# Patient Record
Sex: FEMALE | Race: WHITE | NOT HISPANIC OR LATINO | Employment: OTHER | URBAN - METROPOLITAN AREA
[De-identification: names, ages, dates, MRNs, and addresses within clinical notes are randomized per-mention and may not be internally consistent; named-entity substitution may affect disease eponyms.]

---

## 2023-02-02 PROBLEM — M19.041 LOCALIZED PRIMARY PANTRAPEZIAL ARTHRITIS OF RIGHT HAND: Status: ACTIVE | Noted: 2023-02-02

## 2023-02-02 PROBLEM — H90.3 SENSORINEURAL HEARING LOSS (SNHL) OF BOTH EARS: Status: ACTIVE | Noted: 2023-02-02

## 2023-02-02 PROBLEM — R06.02 EXERTIONAL SHORTNESS OF BREATH: Status: ACTIVE | Noted: 2023-02-02

## 2023-02-02 PROBLEM — R20.2 NUMBNESS AND TINGLING OF RIGHT ARM: Status: ACTIVE | Noted: 2023-02-02

## 2023-02-02 PROBLEM — M93.1 KIENBOECK DISEASE OF ADULTS: Status: ACTIVE | Noted: 2023-02-02

## 2023-02-02 PROBLEM — C50.119 MALIGNANT NEOPLASM OF CENTRAL PART OF FEMALE BREAST (MULTI): Status: ACTIVE | Noted: 2023-02-02

## 2023-02-02 PROBLEM — R30.0 DYSURIA: Status: ACTIVE | Noted: 2023-02-02

## 2023-02-02 PROBLEM — R20.2 RIGHT HAND PARESTHESIA: Status: ACTIVE | Noted: 2023-02-02

## 2023-02-02 PROBLEM — N39.0 UTI (URINARY TRACT INFECTION): Status: ACTIVE | Noted: 2023-02-02

## 2023-02-02 PROBLEM — G56.11 RIGHT MEDIAN NERVE NEUROPATHY: Status: ACTIVE | Noted: 2023-02-02

## 2023-02-02 PROBLEM — L03.019 PARONYCHIA, ACUTE, FINGER: Status: ACTIVE | Noted: 2023-02-02

## 2023-02-02 PROBLEM — E55.9 MILD VITAMIN D DEFICIENCY: Status: ACTIVE | Noted: 2023-02-02

## 2023-02-02 PROBLEM — R20.0 NUMBNESS AND TINGLING OF RIGHT ARM: Status: ACTIVE | Noted: 2023-02-02

## 2023-02-02 PROBLEM — M54.12 CERVICAL RADICULOPATHY AT C8: Status: ACTIVE | Noted: 2023-02-02

## 2023-02-02 PROBLEM — R41.3 COMPLAINTS OF MEMORY DISTURBANCE: Status: ACTIVE | Noted: 2023-02-02

## 2023-02-02 PROBLEM — G56.01 CARPAL TUNNEL SYNDROME OF RIGHT WRIST: Status: ACTIVE | Noted: 2023-02-02

## 2023-02-02 PROBLEM — R20.2 TINGLING SENSATION: Status: ACTIVE | Noted: 2023-02-02

## 2023-02-02 PROBLEM — R73.9 BORDERLINE HYPERGLYCEMIA: Status: ACTIVE | Noted: 2023-02-02

## 2023-02-02 PROBLEM — I10 HTN (HYPERTENSION), BENIGN: Status: ACTIVE | Noted: 2023-02-02

## 2023-02-02 PROBLEM — M77.8 TENDONITIS OF ELBOW, RIGHT: Status: ACTIVE | Noted: 2023-02-02

## 2023-02-02 PROBLEM — M85.80 OSTEOPENIA: Status: ACTIVE | Noted: 2023-02-02

## 2023-02-02 PROBLEM — H61.23 BILATERAL IMPACTED CERUMEN: Status: ACTIVE | Noted: 2023-02-02

## 2023-02-02 PROBLEM — Z78.0 ASYMPTOMATIC POSTMENOPAUSAL STATE: Status: ACTIVE | Noted: 2023-02-02

## 2023-02-02 RX ORDER — ASPIRIN 81 MG/1
1 TABLET ORAL DAILY
COMMUNITY
Start: 2020-08-14 | End: 2023-03-16 | Stop reason: ALTCHOICE

## 2023-02-02 RX ORDER — LOSARTAN POTASSIUM 100 MG/1
1 TABLET ORAL DAILY
COMMUNITY
Start: 2016-06-06 | End: 2023-03-20 | Stop reason: SDUPTHER

## 2023-02-02 RX ORDER — EXEMESTANE 25 MG/1
1 TABLET ORAL DAILY
COMMUNITY
Start: 2014-05-08 | End: 2023-03-20 | Stop reason: WASHOUT

## 2023-02-02 RX ORDER — HYDROCHLOROTHIAZIDE 25 MG/1
1 TABLET ORAL DAILY
COMMUNITY
Start: 2016-05-02 | End: 2023-03-20 | Stop reason: SDUPTHER

## 2023-02-02 RX ORDER — ATORVASTATIN CALCIUM 20 MG/1
1 TABLET, FILM COATED ORAL DAILY
COMMUNITY
Start: 2022-04-01 | End: 2023-03-20 | Stop reason: SDUPTHER

## 2023-02-02 RX ORDER — METOPROLOL SUCCINATE 25 MG/1
1 TABLET, EXTENDED RELEASE ORAL DAILY
COMMUNITY
Start: 2014-05-08 | End: 2023-03-20 | Stop reason: SDUPTHER

## 2023-03-05 NOTE — PROGRESS NOTES
Subjective   Reason for Visit: Alicia Burris is an 68 y.o. female here for a Medicare Wellness visit.     Past Medical, Surgical, and Family History reviewed and updated in chart.         HPI      Patient is here for annual wellness visit and follow-up of chronic conditions, last seen in November.     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   - HTN on HCTZ 25mg and losartan 100mg and metoprolol 25mg, also on baby aspirin for primary prevention blood pressures were elevated at last visit but reported in the setting of her  having a TAVR, blood pressures were good at home 116/74, today slighter higher   - HLD - on atorvastatin 20mg with moderate to severe calcifications on CT chest  - Tobacco use - under 1 PPD x many years, not interested in quitting at present but may be amenable in the future. Has had her first lung cancer screening in march unremarkable.  Failed trials of NRT and Chantix in the past, previously 1 PPD, now at 1.5 packs per week! Not interested in further intervention with quitting, just reducing to 5-6 per day  has not coughed   - Osteopenia last DEXA 2017, concerned about increased risk for bone fracture given elevated FRAX in the past but not currently interested in repeating.  - Breast cancer - stage II right invasive ductal carcinoma s/p partial mastectomy and RT 2011, s/p letrozole stopped in 2020 and exemstane. Gets regular mammograms now which are stable seen by oncology as well stable.   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     Patient Care Team:  Harriet Billingsley DO as PCP - General  Dominique Tang MD as PCP - [employer] PCP     Review of Systems    Objective   Vitals:  /85 (BP Location: Left arm, Patient Position: Sitting, BP Cuff Size: Adult)   Pulse 83   Temp 36.7 °C (98.1 °F)   Ht 1.651 m (5' 5\")   Wt 68 kg (150 lb)   BMI 24.96 kg/m²       Physical Exam  Constitutional:       General: She is not in acute distress.  HENT:      Head: Normocephalic and atraumatic.   Eyes:      Conjunctiva/sclera: Conjunctivae normal.   Cardiovascular:      Rate and Rhythm: Normal rate and regular rhythm.      Heart sounds: No murmur heard.  Pulmonary:      Effort: Pulmonary effort is normal.      Breath sounds: Normal breath sounds. No wheezing or rhonchi.   Abdominal:      General: There is no distension.      Palpations: Abdomen is soft.      Tenderness: There is no abdominal tenderness. There is no guarding or rebound.   Musculoskeletal:         General: No tenderness.      Right lower leg: No edema.      Left lower leg: No edema.   Skin:     General: Skin is warm and dry.   Neurological:      General: No focal deficit present.      Mental Status: She is alert and oriented to person, place, and time.   Psychiatric:         Mood and Affect: Mood normal.         Assessment/Plan   Problem List Items Addressed This Visit          Circulatory    HTN (hypertension), benign    Current Assessment & Plan     Elevated today though home readings wtihin normal limits will continue to monitor at home.          Relevant Orders    CBC and Auto Differential    Comprehensive Metabolic Panel    Lipid Panel    TSH with reflex to Free T4 if abnormal    Hemoglobin A1C       Musculoskeletal    Osteopenia    Current Assessment & " Plan     Due for repeat bone density test          Relevant Orders    Vitamin D 25-Hydroxy,Total       Other    Kienboeck disease of adults    Current Assessment & Plan     S/p surgical intervention now with OT, continue pain control and followup          Malignant neoplasm of central part of female breast (CMS/HCC)    Current Assessment & Plan     S/p partial mastectomy and RT 2011 s/p letrozole stopped in 2020. Most recent mammogram stable.           Other Visit Diagnoses       Personal history of tobacco use, presenting hazards to health    -  Primary    Relevant Orders    CT lung screening low dose    Asymptomatic menopausal state        Relevant Orders    XR DEXA bone density    Need for vaccination        Relevant Orders    Pneumococcal conjugate vaccine 20-valent IM (Completed)    Screening for osteoporosis        Relevant Orders    XR DEXA bone density    Encounter for screening mammogram for breast cancer        Relevant Orders    BI mammo bilateral screening tomosynthesis    Screening for colorectal cancer        Relevant Orders    Cologuard® colon cancer screening    Routine general medical examination at health care facility        Relevant Orders    1 Year Follow Up In [facility] - PCP - Wellness Exam    Macrocytosis        Relevant Orders    Vitamin B12    Folate    Hyperglycemia        Relevant Orders    Hemoglobin A1C    Skin cancer screening        Relevant Orders    Referral to Dermatology

## 2023-03-05 NOTE — PROGRESS NOTES
Subjective   Reason for Visit: Alicia Burris is an 68 y.o. female here for a Medicare Wellness visit.               HPI                        Patient Care Team:  Harriet Billingsley DO as PCP - General     Review of Systems    Objective   Vitals:  There were no vitals taken for this visit.      Physical Exam    Assessment/Plan   Problem List Items Addressed This Visit    None

## 2023-03-16 ENCOUNTER — OFFICE VISIT (OUTPATIENT)
Dept: PRIMARY CARE | Facility: CLINIC | Age: 68
End: 2023-03-16
Payer: MEDICARE

## 2023-03-16 VITALS
HEIGHT: 65 IN | SYSTOLIC BLOOD PRESSURE: 161 MMHG | HEART RATE: 83 BPM | BODY MASS INDEX: 24.99 KG/M2 | WEIGHT: 150 LBS | TEMPERATURE: 98.1 F | DIASTOLIC BLOOD PRESSURE: 85 MMHG

## 2023-03-16 DIAGNOSIS — Z00.00 ROUTINE GENERAL MEDICAL EXAMINATION AT HEALTH CARE FACILITY: ICD-10-CM

## 2023-03-16 DIAGNOSIS — Z13.820 SCREENING FOR OSTEOPOROSIS: ICD-10-CM

## 2023-03-16 DIAGNOSIS — Z87.891 PERSONAL HISTORY OF TOBACCO USE, PRESENTING HAZARDS TO HEALTH: Primary | ICD-10-CM

## 2023-03-16 DIAGNOSIS — Z12.83 SKIN CANCER SCREENING: ICD-10-CM

## 2023-03-16 DIAGNOSIS — R73.9 HYPERGLYCEMIA: ICD-10-CM

## 2023-03-16 DIAGNOSIS — Z12.11 SCREENING FOR COLORECTAL CANCER: ICD-10-CM

## 2023-03-16 DIAGNOSIS — Z23 NEED FOR VACCINATION: ICD-10-CM

## 2023-03-16 DIAGNOSIS — Z12.31 ENCOUNTER FOR SCREENING MAMMOGRAM FOR BREAST CANCER: ICD-10-CM

## 2023-03-16 DIAGNOSIS — D75.89 MACROCYTOSIS: ICD-10-CM

## 2023-03-16 DIAGNOSIS — M93.1 KIENBOECK DISEASE OF ADULTS: ICD-10-CM

## 2023-03-16 DIAGNOSIS — Z78.0 ASYMPTOMATIC MENOPAUSAL STATE: ICD-10-CM

## 2023-03-16 DIAGNOSIS — C50.119 MALIGNANT NEOPLASM OF CENTRAL PORTION OF FEMALE BREAST, UNSPECIFIED ESTROGEN RECEPTOR STATUS, UNSPECIFIED LATERALITY (MULTI): ICD-10-CM

## 2023-03-16 DIAGNOSIS — Z12.12 SCREENING FOR COLORECTAL CANCER: ICD-10-CM

## 2023-03-16 DIAGNOSIS — M85.80 OSTEOPENIA, UNSPECIFIED LOCATION: ICD-10-CM

## 2023-03-16 DIAGNOSIS — I10 HTN (HYPERTENSION), BENIGN: ICD-10-CM

## 2023-03-16 PROBLEM — L03.019 PARONYCHIA, ACUTE, FINGER: Status: RESOLVED | Noted: 2023-02-02 | Resolved: 2023-03-16

## 2023-03-16 PROBLEM — G56.01 CARPAL TUNNEL SYNDROME OF RIGHT WRIST: Status: RESOLVED | Noted: 2023-02-02 | Resolved: 2023-03-16

## 2023-03-16 PROBLEM — M77.8 TENDONITIS OF ELBOW, RIGHT: Status: RESOLVED | Noted: 2023-02-02 | Resolved: 2023-03-16

## 2023-03-16 PROCEDURE — 3077F SYST BP >= 140 MM HG: CPT | Performed by: INTERNAL MEDICINE

## 2023-03-16 PROCEDURE — 3079F DIAST BP 80-89 MM HG: CPT | Performed by: INTERNAL MEDICINE

## 2023-03-16 PROCEDURE — 1159F MED LIST DOCD IN RCRD: CPT | Performed by: INTERNAL MEDICINE

## 2023-03-16 PROCEDURE — 1170F FXNL STATUS ASSESSED: CPT | Performed by: INTERNAL MEDICINE

## 2023-03-16 PROCEDURE — 4004F PT TOBACCO SCREEN RCVD TLK: CPT | Performed by: INTERNAL MEDICINE

## 2023-03-16 PROCEDURE — 90677 PCV20 VACCINE IM: CPT | Performed by: INTERNAL MEDICINE

## 2023-03-16 PROCEDURE — G0009 ADMIN PNEUMOCOCCAL VACCINE: HCPCS | Performed by: INTERNAL MEDICINE

## 2023-03-16 PROCEDURE — 99213 OFFICE O/P EST LOW 20 MIN: CPT | Performed by: INTERNAL MEDICINE

## 2023-03-16 PROCEDURE — 1160F RVW MEDS BY RX/DR IN RCRD: CPT | Performed by: INTERNAL MEDICINE

## 2023-03-16 RX ORDER — MELOXICAM 15 MG/1
15 TABLET ORAL DAILY
COMMUNITY
End: 2023-03-20 | Stop reason: SDUPTHER

## 2023-03-16 ASSESSMENT — ENCOUNTER SYMPTOMS
LOSS OF SENSATION IN FEET: 1
DEPRESSION: 0
OCCASIONAL FEELINGS OF UNSTEADINESS: 0

## 2023-03-16 ASSESSMENT — ACTIVITIES OF DAILY LIVING (ADL)
GROCERY_SHOPPING: INDEPENDENT
BATHING: INDEPENDENT
DRESSING: INDEPENDENT
MANAGING_FINANCES: NEEDS ASSISTANCE
DOING_HOUSEWORK: INDEPENDENT
TAKING_MEDICATION: INDEPENDENT

## 2023-03-16 ASSESSMENT — PATIENT HEALTH QUESTIONNAIRE - PHQ9
2. FEELING DOWN, DEPRESSED OR HOPELESS: NOT AT ALL
1. LITTLE INTEREST OR PLEASURE IN DOING THINGS: NOT AT ALL
SUM OF ALL RESPONSES TO PHQ9 QUESTIONS 1 AND 2: 0

## 2023-03-16 NOTE — PATIENT INSTRUCTIONS
Bring in your power of  documentation or living will at your next visit for us to scan   Recommend bivalent COVID booster

## 2023-03-20 DIAGNOSIS — E78.5 HYPERLIPIDEMIA, UNSPECIFIED HYPERLIPIDEMIA TYPE: ICD-10-CM

## 2023-03-20 DIAGNOSIS — I10 ESSENTIAL HYPERTENSION: Primary | ICD-10-CM

## 2023-03-20 DIAGNOSIS — R52 PAIN: Primary | ICD-10-CM

## 2023-03-20 DIAGNOSIS — I10 ESSENTIAL HYPERTENSION: ICD-10-CM

## 2023-03-20 RX ORDER — HYDROCHLOROTHIAZIDE 25 MG/1
25 TABLET ORAL DAILY
Qty: 90 TABLET | Refills: 1 | Status: SHIPPED | OUTPATIENT
Start: 2023-03-20 | End: 2023-06-20

## 2023-03-20 RX ORDER — METOPROLOL SUCCINATE 25 MG/1
25 TABLET, EXTENDED RELEASE ORAL DAILY
Qty: 90 TABLET | Refills: 1 | Status: SHIPPED | OUTPATIENT
Start: 2023-03-20 | End: 2023-06-20

## 2023-03-20 RX ORDER — LOSARTAN POTASSIUM 100 MG/1
100 TABLET ORAL DAILY
Qty: 90 TABLET | Refills: 1 | Status: SHIPPED | OUTPATIENT
Start: 2023-03-20 | End: 2023-06-20

## 2023-03-20 RX ORDER — METOPROLOL SUCCINATE 25 MG/1
25 TABLET, EXTENDED RELEASE ORAL DAILY
Qty: 90 TABLET | Refills: 1 | Status: SHIPPED | OUTPATIENT
Start: 2023-03-20 | End: 2023-03-20 | Stop reason: SDUPTHER

## 2023-03-20 RX ORDER — HYDROCHLOROTHIAZIDE 25 MG/1
25 TABLET ORAL DAILY
Qty: 90 TABLET | Refills: 1 | Status: SHIPPED | OUTPATIENT
Start: 2023-03-20 | End: 2023-03-20 | Stop reason: SDUPTHER

## 2023-03-20 RX ORDER — LOSARTAN POTASSIUM 100 MG/1
100 TABLET ORAL DAILY
Qty: 90 TABLET | Refills: 1 | Status: SHIPPED | OUTPATIENT
Start: 2023-03-20 | End: 2023-03-20 | Stop reason: SDUPTHER

## 2023-03-20 RX ORDER — ATORVASTATIN CALCIUM 20 MG/1
20 TABLET, FILM COATED ORAL DAILY
Qty: 90 TABLET | Refills: 1 | Status: SHIPPED | OUTPATIENT
Start: 2023-03-20 | End: 2023-08-07

## 2023-03-20 RX ORDER — MELOXICAM 15 MG/1
15 TABLET ORAL DAILY
Qty: 90 TABLET | Refills: 1 | Status: SHIPPED | OUTPATIENT
Start: 2023-03-20 | End: 2023-06-29

## 2023-03-20 RX ORDER — ATORVASTATIN CALCIUM 20 MG/1
20 TABLET, FILM COATED ORAL DAILY
Qty: 90 TABLET | Refills: 1 | Status: SHIPPED | OUTPATIENT
Start: 2023-03-20 | End: 2023-03-20 | Stop reason: SDUPTHER

## 2023-05-18 DIAGNOSIS — R52 PAIN: ICD-10-CM

## 2023-05-18 RX ORDER — MELOXICAM 15 MG/1
15 TABLET ORAL DAILY
Qty: 90 TABLET | Refills: 1 | Status: CANCELLED | OUTPATIENT
Start: 2023-05-18

## 2023-06-19 DIAGNOSIS — I10 ESSENTIAL HYPERTENSION: ICD-10-CM

## 2023-06-20 RX ORDER — LOSARTAN POTASSIUM 100 MG/1
TABLET ORAL
Qty: 90 TABLET | Refills: 1 | Status: SHIPPED | OUTPATIENT
Start: 2023-06-20 | End: 2023-12-06

## 2023-06-20 RX ORDER — HYDROCHLOROTHIAZIDE 25 MG/1
TABLET ORAL
Qty: 90 TABLET | Refills: 1 | Status: SHIPPED | OUTPATIENT
Start: 2023-06-20 | End: 2023-12-06

## 2023-06-20 RX ORDER — METOPROLOL SUCCINATE 25 MG/1
TABLET, EXTENDED RELEASE ORAL
Qty: 90 TABLET | Refills: 1 | Status: SHIPPED | OUTPATIENT
Start: 2023-06-20 | End: 2023-12-06

## 2023-06-28 DIAGNOSIS — R52 PAIN: ICD-10-CM

## 2023-06-29 RX ORDER — MELOXICAM 15 MG/1
TABLET ORAL
Qty: 100 TABLET | Refills: 2 | Status: SHIPPED | OUTPATIENT
Start: 2023-06-29

## 2023-08-04 DIAGNOSIS — E78.5 HYPERLIPIDEMIA, UNSPECIFIED HYPERLIPIDEMIA TYPE: ICD-10-CM

## 2023-08-07 RX ORDER — ATORVASTATIN CALCIUM 20 MG/1
20 TABLET, FILM COATED ORAL DAILY
Qty: 90 TABLET | Refills: 3 | Status: SHIPPED | OUTPATIENT
Start: 2023-08-07

## 2023-12-01 DIAGNOSIS — I10 ESSENTIAL HYPERTENSION: ICD-10-CM

## 2023-12-06 RX ORDER — HYDROCHLOROTHIAZIDE 25 MG/1
TABLET ORAL
Qty: 90 TABLET | Refills: 0 | Status: SHIPPED | OUTPATIENT
Start: 2023-12-06 | End: 2024-02-05

## 2023-12-06 RX ORDER — METOPROLOL SUCCINATE 25 MG/1
TABLET, EXTENDED RELEASE ORAL
Qty: 90 TABLET | Refills: 0 | Status: SHIPPED | OUTPATIENT
Start: 2023-12-06 | End: 2024-02-05

## 2023-12-06 RX ORDER — LOSARTAN POTASSIUM 100 MG/1
TABLET ORAL
Qty: 90 TABLET | Refills: 0 | Status: SHIPPED | OUTPATIENT
Start: 2023-12-06 | End: 2024-02-05

## 2024-01-11 ENCOUNTER — APPOINTMENT (OUTPATIENT)
Dept: RADIOLOGY | Facility: CLINIC | Age: 69
End: 2024-01-11
Payer: MEDICARE

## 2024-02-04 DIAGNOSIS — I10 ESSENTIAL HYPERTENSION: ICD-10-CM

## 2024-02-05 RX ORDER — HYDROCHLOROTHIAZIDE 25 MG/1
TABLET ORAL
Qty: 90 TABLET | Refills: 0 | Status: SHIPPED | OUTPATIENT
Start: 2024-02-05 | End: 2024-04-30

## 2024-02-05 RX ORDER — LOSARTAN POTASSIUM 100 MG/1
TABLET ORAL
Qty: 90 TABLET | Refills: 0 | Status: SHIPPED | OUTPATIENT
Start: 2024-02-05 | End: 2024-04-30

## 2024-02-05 RX ORDER — METOPROLOL SUCCINATE 25 MG/1
TABLET, EXTENDED RELEASE ORAL
Qty: 90 TABLET | Refills: 0 | Status: SHIPPED | OUTPATIENT
Start: 2024-02-05 | End: 2024-04-30

## 2024-03-28 DIAGNOSIS — R52 PAIN: ICD-10-CM

## 2024-04-19 RX ORDER — MELOXICAM 15 MG/1
15 TABLET ORAL DAILY
Qty: 100 TABLET | Refills: 2 | OUTPATIENT
Start: 2024-04-19

## 2024-04-29 DIAGNOSIS — I10 ESSENTIAL HYPERTENSION: ICD-10-CM

## 2024-04-30 RX ORDER — METOPROLOL SUCCINATE 25 MG/1
TABLET, EXTENDED RELEASE ORAL
Qty: 90 TABLET | Refills: 3 | Status: SHIPPED | OUTPATIENT
Start: 2024-04-30

## 2024-04-30 RX ORDER — LOSARTAN POTASSIUM 100 MG/1
TABLET ORAL
Qty: 90 TABLET | Refills: 3 | Status: SHIPPED | OUTPATIENT
Start: 2024-04-30

## 2024-04-30 RX ORDER — HYDROCHLOROTHIAZIDE 25 MG/1
TABLET ORAL
Qty: 90 TABLET | Refills: 3 | Status: SHIPPED | OUTPATIENT
Start: 2024-04-30

## 2024-07-05 DIAGNOSIS — E78.5 HYPERLIPIDEMIA, UNSPECIFIED HYPERLIPIDEMIA TYPE: ICD-10-CM

## 2024-07-08 RX ORDER — ATORVASTATIN CALCIUM 20 MG/1
20 TABLET, FILM COATED ORAL DAILY
Qty: 90 TABLET | Refills: 0 | Status: SHIPPED | OUTPATIENT
Start: 2024-07-08

## 2024-07-09 ENCOUNTER — OFFICE VISIT (OUTPATIENT)
Dept: PRIMARY CARE | Facility: CLINIC | Age: 69
End: 2024-07-09
Payer: MEDICARE

## 2024-07-09 VITALS
BODY MASS INDEX: 24.59 KG/M2 | WEIGHT: 144 LBS | OXYGEN SATURATION: 96 % | HEART RATE: 83 BPM | SYSTOLIC BLOOD PRESSURE: 169 MMHG | TEMPERATURE: 98 F | DIASTOLIC BLOOD PRESSURE: 87 MMHG | HEIGHT: 64 IN

## 2024-07-09 DIAGNOSIS — I10 HTN (HYPERTENSION), BENIGN: ICD-10-CM

## 2024-07-09 DIAGNOSIS — F17.200 TOBACCO USE DISORDER: ICD-10-CM

## 2024-07-09 DIAGNOSIS — D69.2 SENILE PURPURA (CMS-HCC): ICD-10-CM

## 2024-07-09 DIAGNOSIS — C50.119 MALIGNANT NEOPLASM OF CENTRAL PORTION OF FEMALE BREAST, UNSPECIFIED ESTROGEN RECEPTOR STATUS, UNSPECIFIED LATERALITY (MULTI): ICD-10-CM

## 2024-07-09 DIAGNOSIS — Z87.891 PERSONAL HISTORY OF NICOTINE DEPENDENCE: ICD-10-CM

## 2024-07-09 DIAGNOSIS — M93.1 KIENBOECK DISEASE OF ADULTS: Primary | ICD-10-CM

## 2024-07-09 DIAGNOSIS — Z12.11 COLON CANCER SCREENING: ICD-10-CM

## 2024-07-09 DIAGNOSIS — Z00.00 ROUTINE GENERAL MEDICAL EXAMINATION AT HEALTH CARE FACILITY: ICD-10-CM

## 2024-07-09 DIAGNOSIS — Z00.00 ENCOUNTER FOR PREVENTATIVE ADULT HEALTH CARE EXAMINATION: ICD-10-CM

## 2024-07-09 DIAGNOSIS — R06.09 DYSPNEA ON EXERTION: ICD-10-CM

## 2024-07-09 DIAGNOSIS — Z78.0 ASYMPTOMATIC MENOPAUSAL STATE: ICD-10-CM

## 2024-07-09 DIAGNOSIS — M85.80 OSTEOPENIA, UNSPECIFIED LOCATION: ICD-10-CM

## 2024-07-09 DIAGNOSIS — E78.5 DYSLIPIDEMIA: ICD-10-CM

## 2024-07-09 PROBLEM — R20.2 TINGLING SENSATION: Status: RESOLVED | Noted: 2023-02-02 | Resolved: 2024-07-09

## 2024-07-09 PROBLEM — N39.0 UTI (URINARY TRACT INFECTION): Status: RESOLVED | Noted: 2023-02-02 | Resolved: 2024-07-09

## 2024-07-09 PROBLEM — R30.0 DYSURIA: Status: RESOLVED | Noted: 2023-02-02 | Resolved: 2024-07-09

## 2024-07-09 PROBLEM — M19.041 LOCALIZED PRIMARY PANTRAPEZIAL ARTHRITIS OF RIGHT HAND: Status: RESOLVED | Noted: 2023-02-02 | Resolved: 2024-07-09

## 2024-07-09 PROBLEM — H61.23 BILATERAL IMPACTED CERUMEN: Status: RESOLVED | Noted: 2023-02-02 | Resolved: 2024-07-09

## 2024-07-09 PROBLEM — M54.12 CERVICAL RADICULOPATHY AT C8: Status: RESOLVED | Noted: 2023-02-02 | Resolved: 2024-07-09

## 2024-07-09 PROCEDURE — 1125F AMNT PAIN NOTED PAIN PRSNT: CPT | Performed by: INTERNAL MEDICINE

## 2024-07-09 PROCEDURE — 1159F MED LIST DOCD IN RCRD: CPT | Performed by: INTERNAL MEDICINE

## 2024-07-09 PROCEDURE — 3079F DIAST BP 80-89 MM HG: CPT | Performed by: INTERNAL MEDICINE

## 2024-07-09 PROCEDURE — 4004F PT TOBACCO SCREEN RCVD TLK: CPT | Performed by: INTERNAL MEDICINE

## 2024-07-09 PROCEDURE — 1158F ADVNC CARE PLAN TLK DOCD: CPT | Performed by: INTERNAL MEDICINE

## 2024-07-09 PROCEDURE — 1123F ACP DISCUSS/DSCN MKR DOCD: CPT | Performed by: INTERNAL MEDICINE

## 2024-07-09 PROCEDURE — G0439 PPPS, SUBSEQ VISIT: HCPCS | Performed by: INTERNAL MEDICINE

## 2024-07-09 PROCEDURE — 99214 OFFICE O/P EST MOD 30 MIN: CPT | Performed by: INTERNAL MEDICINE

## 2024-07-09 PROCEDURE — 3077F SYST BP >= 140 MM HG: CPT | Performed by: INTERNAL MEDICINE

## 2024-07-09 PROCEDURE — 1160F RVW MEDS BY RX/DR IN RCRD: CPT | Performed by: INTERNAL MEDICINE

## 2024-07-09 PROCEDURE — 1170F FXNL STATUS ASSESSED: CPT | Performed by: INTERNAL MEDICINE

## 2024-07-09 RX ORDER — AMLODIPINE AND OLMESARTAN MEDOXOMIL 5; 40 MG/1; MG/1
1 TABLET ORAL DAILY
Qty: 90 TABLET | Refills: 0 | Status: SHIPPED | OUTPATIENT
Start: 2024-07-09

## 2024-07-09 RX ORDER — NAPROXEN SODIUM 220 MG/1
81 TABLET, FILM COATED ORAL
COMMUNITY
End: 2024-07-09 | Stop reason: WASHOUT

## 2024-07-09 RX ORDER — EXEMESTANE 25 MG/1
TABLET ORAL
COMMUNITY

## 2024-07-09 RX ORDER — ALBUTEROL SULFATE 90 UG/1
2 AEROSOL, METERED RESPIRATORY (INHALATION) EVERY 4 HOURS PRN
Qty: 8 G | Refills: 11 | Status: SHIPPED | OUTPATIENT
Start: 2024-07-09 | End: 2025-07-09

## 2024-07-09 RX ORDER — KETOCONAZOLE 20 MG/ML
SHAMPOO, SUSPENSION TOPICAL
COMMUNITY
Start: 2023-12-11

## 2024-07-09 RX ORDER — AMOXICILLIN 500 MG/1
500 CAPSULE ORAL
COMMUNITY
Start: 2023-05-25 | End: 2024-07-09 | Stop reason: WASHOUT

## 2024-07-09 RX ORDER — METHYLPREDNISOLONE 4 MG/1
TABLET ORAL
COMMUNITY
Start: 2024-02-29 | End: 2024-07-09 | Stop reason: WASHOUT

## 2024-07-09 RX ORDER — DOXEPIN HYDROCHLORIDE 10 MG/1
CAPSULE ORAL
COMMUNITY
Start: 2024-04-01 | End: 2024-07-09 | Stop reason: WASHOUT

## 2024-07-09 RX ORDER — CEPHALEXIN 500 MG/1
CAPSULE ORAL
COMMUNITY
Start: 2022-08-14 | End: 2024-07-09 | Stop reason: WASHOUT

## 2024-07-09 RX ORDER — PHENAZOPYRIDINE HYDROCHLORIDE 95 MG/1
TABLET ORAL
COMMUNITY
Start: 2022-08-14 | End: 2024-07-09 | Stop reason: WASHOUT

## 2024-07-09 RX ORDER — GLUCOSAMINE HCL 500 MG
TABLET ORAL
COMMUNITY

## 2024-07-09 RX ORDER — TRIAMCINOLONE ACETONIDE 1 MG/G
CREAM TOPICAL
COMMUNITY
Start: 2023-12-12

## 2024-07-09 RX ORDER — NITROFURANTOIN 25; 75 MG/1; MG/1
CAPSULE ORAL 2 TIMES DAILY
COMMUNITY
Start: 2017-01-12 | End: 2024-07-09 | Stop reason: WASHOUT

## 2024-07-09 RX ORDER — ASPIRIN 325 MG
TABLET ORAL
COMMUNITY
End: 2024-07-09 | Stop reason: WASHOUT

## 2024-07-09 ASSESSMENT — ENCOUNTER SYMPTOMS
DEPRESSION: 0
LOSS OF SENSATION IN FEET: 1
OCCASIONAL FEELINGS OF UNSTEADINESS: 0

## 2024-07-09 ASSESSMENT — PAIN SCALES - GENERAL: PAINLEVEL: 5

## 2024-07-09 ASSESSMENT — PATIENT HEALTH QUESTIONNAIRE - PHQ9
SUM OF ALL RESPONSES TO PHQ9 QUESTIONS 1 AND 2: 0
2. FEELING DOWN, DEPRESSED OR HOPELESS: NOT AT ALL
1. LITTLE INTEREST OR PLEASURE IN DOING THINGS: NOT AT ALL

## 2024-07-09 ASSESSMENT — ACTIVITIES OF DAILY LIVING (ADL)
DRESSING: INDEPENDENT
GROCERY_SHOPPING: INDEPENDENT
BATHING: INDEPENDENT
DOING_HOUSEWORK: INDEPENDENT
MANAGING_FINANCES: INDEPENDENT
TAKING_MEDICATION: INDEPENDENT

## 2024-07-09 NOTE — PROGRESS NOTES
Subjective     Patient ID: Alicia Burris is a 69 y.o. female who presents for Annual Exam and Medicare Annual Wellness Visit Subsequent, annual wellness visit and followup of chronic conditions.   HPI  69-year-old female here for annual wellness visit and follow-up of chronic conditions, last seen in March of last year.    - Has found that she does not like to leave the house as much as she used to, believes to be since COVID.   - Fell when coming in from the back door hitting the step and fell on her knees.     PMHx:  - Kienbox disease - (osteonecrosis of the bone), has pain all the time, takes aleve for the pain, s/p partial wrist replacement still with residual discomfort of the right wrist, she still requires bracing with heating or icing and was recommended no further intervention.   - Bilatral SNHL - seen by ENT not a candidate for hearing aids.   - HTN - on HCTZ 25mg, losartan 100mg and metoprolol 25mg, has not been measuring home blood pressure readings.   - HLD - on atorvastatin 20mg - with moderate to severe calcifications on CT chest  - Tobacco use - under 1 PPD x many years, not interested in quitting -  but may be amenable in the future.  Failed trials of NRT and Chantix in the past. Has cut back to <1/2 PPD.  Not interested in smoking cessation clinic. States she  enjoys the tobacco.   - LDCT yearly the last performed in 2022  - Osteopenia -  last DEXA 2017 with elevated FRAX  - Breast cancer - stage II right invasive ductal carcinoma - s/p partial mastectomy and RT 2011, s/p letrozole and exemstane.  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   - 3 children - one son in [place] with his wife, son in [place] with wife and 3 kids, daughter in [place] recently got .     Lifestyle - lost 6 pounds since last being seen.   - Diet - does not eat much, eats later in the day, snacks on popcorn.breakfast scoops of yogurt and kashi cereal, never does lunch, varied dinner.   - Exercise - gardens every day, less in the winter, tries to get on the treadmill.     Patient Care Team:  Harriet Billingsley DO as PCP - General (Internal Medicine)  Harriet Billingsley DO as PCP - [employer] PCP      Objective       Current Outpatient Medications:     atorvastatin (Lipitor) 20 mg tablet, TAKE 1 TABLET BY MOUTH ONCE  DAILY, Disp: 90 tablet, Rfl: 0    cholecalciferol, vitamin D3, 75 mcg (3,000 unit) tablet, Take by mouth once daily., Disp: , Rfl:     hydroCHLOROthiazide (HYDRODiuril) 25 mg tablet, TAKE 1 TABLET BY MOUTH ONCE  DAILY, Disp: 90 tablet, Rfl: 3    ketoconazole (NIZOral) 2 % shampoo, , Disp: , Rfl:     losartan (Cozaar) 100 mg tablet, TAKE 1 TABLET BY MOUTH ONCE  DAILY, Disp: 90 tablet, Rfl: 3    meloxicam (Mobic) 15 mg tablet, TAKE 1 TABLET BY MOUTH ONCE  DAILY DAILY, Disp: 100 tablet, Rfl: 2    metoprolol succinate XL (Toprol-XL) 25 mg 24 hr tablet, TAKE 1 TABLET BY MOUTH ONCE  DAILY, Disp: 90 tablet, Rfl: 3    triamcinolone (Kenalog) 0.1 % cream, , Disp: , Rfl:     albuterol (Ventolin HFA) 90 mcg/actuation inhaler, Inhale 2 puffs every 4 hours if needed for wheezing or shortness of breath., Disp: 8 g, Rfl: 11    amlodipine-olmesartan (Christen) 5-40 mg tablet, Take 1 tablet by mouth once daily., Disp: 90 tablet, Rfl: 0    exemestane (Aromasin) 25 mg tablet, , Disp: , Rfl:       /87   Pulse 83   Temp 36.7 °C (98 °F) (Temporal)   Ht 1.62 m (5' 3.78\")   Wt 65.3 kg (144 lb)   SpO2 96%   BMI 24.89 kg/m²       Physical Examination:   General: Awake, alert, appears stated age   Head/eyes/ears: NCAT, EOMI, PERRL, " TM WNL, no cerumen  Throat: moist mucus membranes, no pharyngeal erythema  Neck: Supple, nontender, no lymphadenopathy, thyroid exam unremarkable   Heart: RRR, no murmurs, rubs or gallops  Lungs: Scattered wheezes appreciated bilaterally  Abdomen: Soft, NT/ND  Extremities: No edema, declines foot examination today skin: Senile purpura, multiple melanocytic nevi appreciated  Neuro: AAO x 3, no FND, gait unremarkable    Assessment/Plan   Problem List Items Addressed This Visit       HTN (hypertension), benign     Elevated today on current regimen of hydrochlorothiazide, losartan and metoprolol.   Switch to olmesartan/amlodipine, stop metoprolol          Relevant Medications    amlodipine-olmesartan (Christen) 5-40 mg tablet    Other Relevant Orders    CBC and Auto Differential    Comprehensive Metabolic Panel    TSH with reflex to Free T4 if abnormal    Kienboeck disease of adults - Primary     S/p surgical intervention does home exercises at home still with severe discomfort  Followup with hand surgery, will refer.          Relevant Orders    Referral to Orthopaedic Surgery    Osteopenia     Due for repeat bone density test with e/o height loss.          Relevant Orders    Vitamin D 25-Hydroxy,Total (for eval of Vitamin D levels)    Malignant neoplasm of central part of female breast (Multi)     S/p partial mastectomy and RT 2011 s/p letrozole stopped in 2020. Most recent mammogram stable.   Followed by Jen Torres encouraged to schedule          Tobacco use disorder     >40PY history of tobacco use  not interested in quitting   Using [email or web address], I have shared information with the patient about interventions to reduce the risk of dying from lung cancer, including quitting smoking and annual lung cancer screening. The patient is eligible for screening based on age (69), smoking history (current smoker, 55 pack-years), and the absence of signs or symptoms of lung cancer. We estimated that 47 out of 1,000 patients like  this patient will die of lung cancer over 5 years. This risk is reduced to 37 out of 1,000 with CT screening. This is based on personalized lung cancer risk generated by the Katki et al. prediction model. We provided the patient with information about the potential harms of screening, including: false positives and false negatives, follow-up diagnostic testing, over-diagnosis, and radiation exposure. I also counseled on the importance of adherence to annual lung cancer LDCT screening, impact of comorbidities and ability or willingness to undergo diagnosis and treatment.    After discussion, the patient decided to pursue yearly LDCT.          Relevant Orders    CT lung screening low dose    Senile purpura (CMS-HCC)     Advised preventive measures         Dyslipidemia     On atorvastatin 20 mg, monitor severe calcifications noted on CT chest.  May consider more aggressive management after repeat blood work.         Relevant Orders    Lipid Panel     Other Visit Diagnoses       Routine general medical examination at health care facility        Asymptomatic menopausal state        Relevant Orders    XR DEXA bone density    Encounter for preventative adult health care examination        Relevant Orders    CBC and Auto Differential    Comprehensive Metabolic Panel    Colon cancer screening        Relevant Orders    Cologuard® colon cancer screening    Dyspnea on exertion        Relevant Medications    albuterol (Ventolin HFA) 90 mcg/actuation inhaler    Other Relevant Orders    Complete Pulmonary Function Test Pre/Post Bronchodialator (Spirometry Pre/Post/DLCO/Lung Volumes)    Personal history of nicotine dependence        Relevant Orders    CT lung screening low dose            Adult Health Examination  Age appropriate screening performed   Healthy lifestyle reviewed.   Depression screen negative  No additional pertinent family history or toxic habits   Cancer screening:   - Cologuard ordered not yet obtained  -  Mammogram 9/23 due for repeat in September   - Pap smear N/A  - Skin cancer prevention strategies reviewed.   Immunizations: Flu declined, COVID booster due, shingrix UTD, UTD with pneumococcal vaccine, due for RSV vaccination at the pharmacy   Dental and visual examinations UTD   DEXA due with height loss.   Discussed adequate Vitamin D intake     Agreeable to follow-up every 3 months for now

## 2024-07-09 NOTE — LETTER
July 10, 2024     Patient: Alicia Burris   YOB: 1955   Date of Visit: 7/9/2024       To Whom It May Concern:    Alicia Burris was seen in my clinic on 7/9/2024 at 11:00 am.     Due to medical conditions, it is recommended that she be excused from jury duty.     If you have any questions or concerns, please don't hesitate to call.         Sincerely,         Harriet Billingsley,         CC: No Recipients

## 2024-07-09 NOTE — PATIENT INSTRUCTIONS
Alicia,   Lung cancer screening - CT of the chest has been ordered. You can call the imaging department to have this scheduled.   Cologuard has been ordered.   Bone density test - please call [phone] or stop by the radiology department on the 1st floor to have this scheduled.   Followup with Jen Torres   Leg cramps - ivory soap in the sheet, also try 1 tablespoon of pickle juice OR mustard. Try magnesium glycinate 400mg per day   Vaccines at the pharmacy   RSV vaccine   COVID booster either now or in September   Consider tetanus shot (Tdap) vaccine   Blood pressure   STOP metoprolol (or reduce by half and then stop)  STOP losartan   START olmesartan/amlodipine combination.   I have ordered blood and/or urine tests for you to do today. The lab can be found on this floor (2nd floor) next to the pharmacy across from the elevators.    Breathing - albuterol inhaler has been sent   Pulmonary function tests to be ordered   Followup 3 months

## 2024-08-02 LAB — NONINV COLON CA DNA+OCC BLD SCRN STL QL: POSITIVE

## 2024-08-05 DIAGNOSIS — R19.5 POSITIVE COLORECTAL CANCER SCREENING USING COLOGUARD TEST: Primary | ICD-10-CM

## 2024-08-13 DIAGNOSIS — Z12.11 COLON CANCER SCREENING: ICD-10-CM

## 2024-08-13 DIAGNOSIS — R19.5 POSITIVE COLORECTAL CANCER SCREENING USING DNA-BASED STOOL TEST: Primary | ICD-10-CM

## 2024-08-13 RX ORDER — SOD SULF/POT CHLORIDE/MAG SULF 1.479 G
TABLET ORAL
Qty: 24 TABLET | Refills: 0 | Status: SHIPPED | OUTPATIENT
Start: 2024-08-13

## 2024-08-13 RX ORDER — SOD SULF/POT CHLORIDE/MAG SULF 1.479 G
TABLET ORAL
Qty: 24 TABLET | Refills: 0 | Status: CANCELLED | OUTPATIENT
Start: 2024-08-13

## 2024-08-14 ENCOUNTER — APPOINTMENT (OUTPATIENT)
Dept: PRIMARY CARE | Facility: CLINIC | Age: 69
End: 2024-08-14
Payer: MEDICARE

## 2024-08-21 ENCOUNTER — HOSPITAL ENCOUNTER (OUTPATIENT)
Dept: RESPIRATORY THERAPY | Facility: HOSPITAL | Age: 69
Discharge: HOME | End: 2024-08-21
Payer: MEDICARE

## 2024-08-21 DIAGNOSIS — R06.09 DYSPNEA ON EXERTION: ICD-10-CM

## 2024-08-21 PROCEDURE — 94726 PLETHYSMOGRAPHY LUNG VOLUMES: CPT

## 2024-08-21 PROCEDURE — 94060 EVALUATION OF WHEEZING: CPT | Performed by: INTERNAL MEDICINE

## 2024-08-21 PROCEDURE — 94729 DIFFUSING CAPACITY: CPT | Performed by: INTERNAL MEDICINE

## 2024-08-21 PROCEDURE — 94726 PLETHYSMOGRAPHY LUNG VOLUMES: CPT | Performed by: INTERNAL MEDICINE

## 2024-09-09 DIAGNOSIS — I10 HTN (HYPERTENSION), BENIGN: ICD-10-CM

## 2024-09-09 RX ORDER — AMLODIPINE AND OLMESARTAN MEDOXOMIL 5; 40 MG/1; MG/1
1 TABLET ORAL DAILY
Qty: 90 TABLET | Refills: 3 | Status: SHIPPED | OUTPATIENT
Start: 2024-09-09

## 2024-09-17 ENCOUNTER — HOSPITAL ENCOUNTER (OUTPATIENT)
Dept: RADIOLOGY | Facility: CLINIC | Age: 69
Discharge: HOME | End: 2024-09-17
Payer: MEDICARE

## 2024-09-17 ENCOUNTER — LAB (OUTPATIENT)
Dept: LAB | Facility: LAB | Age: 69
End: 2024-09-17
Payer: MEDICARE

## 2024-09-17 DIAGNOSIS — I10 HTN (HYPERTENSION), BENIGN: ICD-10-CM

## 2024-09-17 DIAGNOSIS — Z00.00 ENCOUNTER FOR PREVENTATIVE ADULT HEALTH CARE EXAMINATION: ICD-10-CM

## 2024-09-17 DIAGNOSIS — E78.5 DYSLIPIDEMIA: ICD-10-CM

## 2024-09-17 DIAGNOSIS — F17.200 TOBACCO USE DISORDER: ICD-10-CM

## 2024-09-17 DIAGNOSIS — M85.80 OSTEOPENIA, UNSPECIFIED LOCATION: ICD-10-CM

## 2024-09-17 DIAGNOSIS — Z87.891 PERSONAL HISTORY OF NICOTINE DEPENDENCE: ICD-10-CM

## 2024-09-17 DIAGNOSIS — Z78.0 ASYMPTOMATIC MENOPAUSAL STATE: ICD-10-CM

## 2024-09-17 LAB
25(OH)D3 SERPL-MCNC: 24 NG/ML (ref 30–100)
ALBUMIN SERPL BCP-MCNC: 4.6 G/DL (ref 3.4–5)
ALP SERPL-CCNC: 98 U/L (ref 33–136)
ALT SERPL W P-5'-P-CCNC: 7 U/L (ref 7–45)
ANION GAP SERPL CALC-SCNC: 13 MMOL/L (ref 10–20)
AST SERPL W P-5'-P-CCNC: 19 U/L (ref 9–39)
BASOPHILS # BLD AUTO: 0.07 X10*3/UL (ref 0–0.1)
BASOPHILS NFR BLD AUTO: 0.9 %
BILIRUB SERPL-MCNC: 0.5 MG/DL (ref 0–1.2)
BUN SERPL-MCNC: 7 MG/DL (ref 6–23)
CALCIUM SERPL-MCNC: 10.1 MG/DL (ref 8.6–10.6)
CHLORIDE SERPL-SCNC: 97 MMOL/L (ref 98–107)
CHOLEST SERPL-MCNC: 178 MG/DL (ref 0–199)
CHOLESTEROL/HDL RATIO: 2.3
CO2 SERPL-SCNC: 31 MMOL/L (ref 21–32)
CREAT SERPL-MCNC: 0.71 MG/DL (ref 0.5–1.05)
EGFRCR SERPLBLD CKD-EPI 2021: >90 ML/MIN/1.73M*2
EOSINOPHIL # BLD AUTO: 0.25 X10*3/UL (ref 0–0.4)
EOSINOPHIL NFR BLD AUTO: 3.3 %
ERYTHROCYTE [DISTWIDTH] IN BLOOD BY AUTOMATED COUNT: 11.9 % (ref 11.5–14.5)
GLUCOSE SERPL-MCNC: 123 MG/DL (ref 74–99)
HCT VFR BLD AUTO: 34.3 % (ref 36–46)
HDLC SERPL-MCNC: 77.4 MG/DL
HGB BLD-MCNC: 11.6 G/DL (ref 12–16)
IMM GRANULOCYTES # BLD AUTO: 0.01 X10*3/UL (ref 0–0.5)
IMM GRANULOCYTES NFR BLD AUTO: 0.1 % (ref 0–0.9)
LDLC SERPL CALC-MCNC: 89 MG/DL
LYMPHOCYTES # BLD AUTO: 1.67 X10*3/UL (ref 0.8–3)
LYMPHOCYTES NFR BLD AUTO: 21.9 %
MCH RBC QN AUTO: 35.5 PG (ref 26–34)
MCHC RBC AUTO-ENTMCNC: 33.8 G/DL (ref 32–36)
MCV RBC AUTO: 105 FL (ref 80–100)
MONOCYTES # BLD AUTO: 0.86 X10*3/UL (ref 0.05–0.8)
MONOCYTES NFR BLD AUTO: 11.3 %
NEUTROPHILS # BLD AUTO: 4.77 X10*3/UL (ref 1.6–5.5)
NEUTROPHILS NFR BLD AUTO: 62.5 %
NON HDL CHOLESTEROL: 101 MG/DL (ref 0–149)
NRBC BLD-RTO: 0 /100 WBCS (ref 0–0)
PLATELET # BLD AUTO: 289 X10*3/UL (ref 150–450)
POTASSIUM SERPL-SCNC: 4.2 MMOL/L (ref 3.5–5.3)
PROT SERPL-MCNC: 7.4 G/DL (ref 6.4–8.2)
RBC # BLD AUTO: 3.27 X10*6/UL (ref 4–5.2)
SODIUM SERPL-SCNC: 137 MMOL/L (ref 136–145)
TRIGL SERPL-MCNC: 57 MG/DL (ref 0–149)
TSH SERPL-ACNC: 1.79 MIU/L (ref 0.44–3.98)
VLDL: 11 MG/DL (ref 0–40)
WBC # BLD AUTO: 7.6 X10*3/UL (ref 4.4–11.3)

## 2024-09-17 PROCEDURE — 85025 COMPLETE CBC W/AUTO DIFF WBC: CPT

## 2024-09-17 PROCEDURE — 80061 LIPID PANEL: CPT

## 2024-09-17 PROCEDURE — 77080 DXA BONE DENSITY AXIAL: CPT | Performed by: RADIOLOGY

## 2024-09-17 PROCEDURE — 84443 ASSAY THYROID STIM HORMONE: CPT

## 2024-09-17 PROCEDURE — 80053 COMPREHEN METABOLIC PANEL: CPT

## 2024-09-17 PROCEDURE — 71271 CT THORAX LUNG CANCER SCR C-: CPT | Performed by: RADIOLOGY

## 2024-09-17 PROCEDURE — 71271 CT THORAX LUNG CANCER SCR C-: CPT

## 2024-09-17 PROCEDURE — 36415 COLL VENOUS BLD VENIPUNCTURE: CPT

## 2024-09-17 PROCEDURE — 77080 DXA BONE DENSITY AXIAL: CPT

## 2024-09-17 PROCEDURE — 82306 VITAMIN D 25 HYDROXY: CPT

## 2024-09-20 DIAGNOSIS — E78.5 HYPERLIPIDEMIA, UNSPECIFIED HYPERLIPIDEMIA TYPE: ICD-10-CM

## 2024-09-20 RX ORDER — ATORVASTATIN CALCIUM 20 MG/1
20 TABLET, FILM COATED ORAL DAILY
Qty: 90 TABLET | Refills: 3 | Status: SHIPPED | OUTPATIENT
Start: 2024-09-20

## 2024-09-22 DIAGNOSIS — D53.9 MACROCYTIC ANEMIA: ICD-10-CM

## 2024-09-22 DIAGNOSIS — Z00.00 ENCOUNTER FOR PREVENTATIVE ADULT HEALTH CARE EXAMINATION: Primary | ICD-10-CM

## 2024-09-23 ENCOUNTER — TELEPHONE (OUTPATIENT)
Dept: PRIMARY CARE | Facility: CLINIC | Age: 69
End: 2024-09-23

## 2024-09-23 DIAGNOSIS — R06.02 EXERTIONAL SHORTNESS OF BREATH: Primary | ICD-10-CM

## 2024-09-23 RX ORDER — ALBUTEROL SULFATE 90 UG/1
2 INHALANT RESPIRATORY (INHALATION) EVERY 6 HOURS PRN
Qty: 18 G | Refills: 11 | Status: SHIPPED | OUTPATIENT
Start: 2024-09-23 | End: 2025-09-23

## 2024-09-27 DIAGNOSIS — Z12.11 COLON CANCER SCREENING: ICD-10-CM

## 2024-09-27 RX ORDER — SOD SULF/POT CHLORIDE/MAG SULF 1.479 G
TABLET ORAL
Qty: 24 TABLET | Refills: 0 | Status: SHIPPED | OUTPATIENT
Start: 2024-09-27

## 2024-09-30 ENCOUNTER — HOSPITAL ENCOUNTER (OUTPATIENT)
Dept: RADIOLOGY | Facility: CLINIC | Age: 69
Discharge: HOME | End: 2024-09-30
Payer: MEDICARE

## 2024-09-30 VITALS — WEIGHT: 145 LBS | BODY MASS INDEX: 25.06 KG/M2

## 2024-09-30 DIAGNOSIS — Z12.31 ENCOUNTER FOR SCREENING MAMMOGRAM FOR MALIGNANT NEOPLASM OF BREAST: ICD-10-CM

## 2024-09-30 PROCEDURE — 77067 SCR MAMMO BI INCL CAD: CPT

## 2024-09-30 PROCEDURE — 77067 SCR MAMMO BI INCL CAD: CPT | Performed by: STUDENT IN AN ORGANIZED HEALTH CARE EDUCATION/TRAINING PROGRAM

## 2024-09-30 PROCEDURE — 77063 BREAST TOMOSYNTHESIS BI: CPT | Performed by: STUDENT IN AN ORGANIZED HEALTH CARE EDUCATION/TRAINING PROGRAM

## 2024-10-02 ENCOUNTER — APPOINTMENT (OUTPATIENT)
Dept: GASTROENTEROLOGY | Facility: EXTERNAL LOCATION | Age: 69
End: 2024-10-02
Payer: MEDICARE

## 2024-10-02 DIAGNOSIS — D12.3 BENIGN NEOPLASM OF TRANSVERSE COLON: ICD-10-CM

## 2024-10-02 DIAGNOSIS — D12.0 BENIGN NEOPLASM OF CECUM: ICD-10-CM

## 2024-10-02 DIAGNOSIS — R19.5 POSITIVE COLORECTAL CANCER SCREENING USING COLOGUARD TEST: ICD-10-CM

## 2024-10-02 DIAGNOSIS — K62.89 RECTAL MASS: Primary | ICD-10-CM

## 2024-10-02 DIAGNOSIS — D12.2 BENIGN NEOPLASM OF ASCENDING COLON: ICD-10-CM

## 2024-10-02 DIAGNOSIS — Z12.12 ENCOUNTER FOR COLORECTAL CANCER SCREENING: ICD-10-CM

## 2024-10-02 DIAGNOSIS — D49.0 NEOPLASM OF UNSPECIFIED BEHAVIOR OF DIGESTIVE SYSTEM: ICD-10-CM

## 2024-10-02 DIAGNOSIS — Z12.11 ENCOUNTER FOR COLORECTAL CANCER SCREENING: ICD-10-CM

## 2024-10-02 PROCEDURE — 88305 TISSUE EXAM BY PATHOLOGIST: CPT

## 2024-10-02 PROCEDURE — 88305 TISSUE EXAM BY PATHOLOGIST: CPT | Performed by: STUDENT IN AN ORGANIZED HEALTH CARE EDUCATION/TRAINING PROGRAM

## 2024-10-02 NOTE — PROGRESS NOTES
Procedure note is completed in [facility]. Report is scanned under 'Media' tab in Epic.    Rectal EUS - Message sent to Dr. Morin

## 2024-10-03 ENCOUNTER — LAB REQUISITION (OUTPATIENT)
Dept: LAB | Facility: HOSPITAL | Age: 69
End: 2024-10-03
Payer: MEDICARE

## 2024-10-03 PROCEDURE — 88305 TISSUE EXAM BY PATHOLOGIST: CPT

## 2024-10-03 PROCEDURE — 88305 TISSUE EXAM BY PATHOLOGIST: CPT | Performed by: STUDENT IN AN ORGANIZED HEALTH CARE EDUCATION/TRAINING PROGRAM

## 2024-10-08 DIAGNOSIS — R06.02 EXERTIONAL SHORTNESS OF BREATH: Primary | ICD-10-CM

## 2024-10-08 RX ORDER — ALBUTEROL SULFATE 90 UG/1
2 INHALANT RESPIRATORY (INHALATION) EVERY 6 HOURS PRN
Qty: 18 G | Refills: 11 | Status: SHIPPED | OUTPATIENT
Start: 2024-10-08 | End: 2025-10-08

## 2024-10-10 ENCOUNTER — APPOINTMENT (OUTPATIENT)
Dept: PRIMARY CARE | Facility: CLINIC | Age: 69
End: 2024-10-10
Payer: MEDICARE

## 2024-10-10 VITALS
SYSTOLIC BLOOD PRESSURE: 181 MMHG | OXYGEN SATURATION: 92 % | HEART RATE: 125 BPM | BODY MASS INDEX: 25.41 KG/M2 | WEIGHT: 147 LBS | DIASTOLIC BLOOD PRESSURE: 93 MMHG

## 2024-10-10 DIAGNOSIS — M87.039: ICD-10-CM

## 2024-10-10 DIAGNOSIS — K62.89 RECTAL MASS: ICD-10-CM

## 2024-10-10 DIAGNOSIS — I10 HTN (HYPERTENSION), BENIGN: Primary | ICD-10-CM

## 2024-10-10 DIAGNOSIS — R06.02 EXERTIONAL SHORTNESS OF BREATH: ICD-10-CM

## 2024-10-10 DIAGNOSIS — R06.09 DYSPNEA ON EXERTION: ICD-10-CM

## 2024-10-10 DIAGNOSIS — M85.80 OSTEOPENIA, UNSPECIFIED LOCATION: ICD-10-CM

## 2024-10-10 DIAGNOSIS — E78.5 DYSLIPIDEMIA: ICD-10-CM

## 2024-10-10 PROBLEM — Z78.0 ASYMPTOMATIC POSTMENOPAUSAL STATE: Status: RESOLVED | Noted: 2023-02-02 | Resolved: 2024-10-10

## 2024-10-10 PROBLEM — R20.0 NUMBNESS AND TINGLING OF RIGHT ARM: Status: RESOLVED | Noted: 2023-02-02 | Resolved: 2024-10-10

## 2024-10-10 PROBLEM — R20.2 NUMBNESS AND TINGLING OF RIGHT ARM: Status: RESOLVED | Noted: 2023-02-02 | Resolved: 2024-10-10

## 2024-10-10 PROBLEM — R20.2 RIGHT HAND PARESTHESIA: Status: RESOLVED | Noted: 2023-02-02 | Resolved: 2024-10-10

## 2024-10-10 PROCEDURE — 1159F MED LIST DOCD IN RCRD: CPT | Performed by: INTERNAL MEDICINE

## 2024-10-10 PROCEDURE — 99215 OFFICE O/P EST HI 40 MIN: CPT | Performed by: INTERNAL MEDICINE

## 2024-10-10 PROCEDURE — 93000 ELECTROCARDIOGRAM COMPLETE: CPT | Performed by: INTERNAL MEDICINE

## 2024-10-10 PROCEDURE — 3077F SYST BP >= 140 MM HG: CPT | Performed by: INTERNAL MEDICINE

## 2024-10-10 PROCEDURE — 1126F AMNT PAIN NOTED NONE PRSNT: CPT | Performed by: INTERNAL MEDICINE

## 2024-10-10 PROCEDURE — G2211 COMPLEX E/M VISIT ADD ON: HCPCS | Performed by: INTERNAL MEDICINE

## 2024-10-10 PROCEDURE — 3080F DIAST BP >= 90 MM HG: CPT | Performed by: INTERNAL MEDICINE

## 2024-10-10 PROCEDURE — 1160F RVW MEDS BY RX/DR IN RCRD: CPT | Performed by: INTERNAL MEDICINE

## 2024-10-10 RX ORDER — OLMESARTAN MEDOXOMIL 20 MG/1
20 TABLET ORAL DAILY
Qty: 90 TABLET | Refills: 1 | Status: SHIPPED | OUTPATIENT
Start: 2024-10-10 | End: 2025-04-08

## 2024-10-10 RX ORDER — FLUTICASONE PROPIONATE AND SALMETEROL 250; 50 UG/1; UG/1
1 POWDER RESPIRATORY (INHALATION)
Qty: 60 EACH | Refills: 11 | Status: SHIPPED | OUTPATIENT
Start: 2024-10-10 | End: 2025-10-10

## 2024-10-10 RX ORDER — ATORVASTATIN CALCIUM 40 MG/1
40 TABLET, FILM COATED ORAL DAILY
Qty: 90 TABLET | Refills: 3 | Status: SHIPPED | OUTPATIENT
Start: 2024-10-10 | End: 2025-10-10

## 2024-10-10 ASSESSMENT — PAIN SCALES - GENERAL: PAINLEVEL: 0-NO PAIN

## 2024-10-10 NOTE — PROGRESS NOTES
Subjective   Patient ID: Alicia Burris is a 69 y.o. female who presents for Follow-up.  HPI  69-year-old female here for follow-up visit, last seen in July for annual wellness visit.    8/24: positive cologuard   Significant asthma on PFTs  9/24: Had covid infection felt etxremely wiped out, now with residual cough for the last month, described as a tickling sensation in her throat.   LDL 89,   LDCT stable repeat 1 year, mild upper lung emphysema, , mild circumferential thickening of the distale esophagus wall   DEXA osteopenia high frax   Macrocytic anemia need to repeat   Vitamin d insufficient     - She had a positive Cologuard and a subsequent colonoscopy showing a rectal mass 1 to 3 cm,, 50 mm nonbleeding polyp in the distal rectum as well as to 6 to 10 mm polyps in the transverse colon and two 4015 mm polyps in the ascending colon.  She was recommended to have lower endoscopic ultrasound, pathology is currently pending.  An appointment is scheduled with Dr. Morin at the end of the month. She was also recommended to have an MRI with pelvic contrast.   - Tobacco use - under 1 PPD x many years, not interested in quitting -  but may be amenable in the future.  Failed trials of NRT and Chantix in the past. Has cut back to <1/2 PPD.  Not interested in smoking cessation clinic. States she  enjoys the tobacco.   - Asthma - Dyspnea on exertion-in the setting of likely chronic tobacco use emphysema findings on CT-ordered for PFTs showing asthma findings. Has not been using her albuterol inhaler in over a week, has noted to be moving a lot slower since COVID.     PMHx:  - Kienbox disease - (osteonecrosis of the bone), has pain all the time, takes aleve for the pain, s/p partial wrist replacement still with residual discomfort of the right wrist, she still requires bracing with heating or icing referred back to hand clinic  - Bilatral SNHL - seen by ENT not a candidate for hearing aids.   - HTN -switched to  olmesartan/amlodipine, stopped metoprolol at last visit, (previously on HCTZ 25mg, losartan 100mg and metoprolol 25mg), measures home blood pressure readings.   - HLD - on atorvastatin 20mg, recent CT showing a calcium score 420  - LDCT yearly -last performed 9/24 stable repeat 1 year, mild upper lung emphysema  - Osteopenia -most recent density notable for high FRAX score  - Breast cancer - stage II right invasive ductal carcinoma - s/p partial mastectomy and RT 2011, s/p letrozole and exemstane.  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   - 3 children - one son in [place] with his wife, son in [place] with wife and 3 kids, daughter in [place] recently got .      Lifestyle - lost 6 pounds since last being seen.   - Diet - does not eat much, eats later in the day, snacks on popcorn.breakfast scoops of yogurt and kashi cereal, never does lunch, varied dinner.   - Exercise - gardens every day, less in the winter, tries to get on the treadmill.  Current Outpatient Medications   Medication Instructions    albuterol (ProAir HFA) 90 mcg/actuation inhaler 2 puffs, inhalation, Every 6 hours PRN    atorvastatin (LIPITOR) 40 mg, oral, Daily    cholecalciferol, vitamin D3, 75 mcg (3,000 unit) tablet oral, Daily RT    fluticasone propion-salmeteroL (Advair Diskus) 250-50 mcg/dose diskus inhaler 1 puff, inhalation, 2 times daily RT, Rinse mouth with water after use to reduce aftertaste and incidence of candidiasis. Do not swallow.    hydroCHLOROthiazide (HYDRODiuril) 25 mg tablet TAKE 1 TABLET BY MOUTH ONCE  DAILY    ketoconazole (NIZOral) 2 %  shampoo     meloxicam (Mobic) 15 mg tablet TAKE 1 TABLET BY MOUTH ONCE  DAILY DAILY    olmesartan (BENICAR) 20 mg, oral, Daily    triamcinolone (Kenalog) 0.1 % cream         Objective     BP (!) 181/93   Pulse (!) 125   Wt 66.7 kg (147 lb)   LMP  (LMP Unknown)   SpO2 92%   BMI 25.41 kg/m²     Physical Exam  General: Anxious, NAD, appropriate affect, accompanied by , speaking full sentenes   HEENT: NCAT, EOMI, pupils symmetric, no conjunctival erythema   Neck: Supple, no LAD   Heart: RRR S1 S2 no murmurs appreciated   Lungs: Scattered wheezes appreciated   Abdomen: Soft, NT/ND, no rebound or guarding, NABS   Extremities: no cyanosis or edema appreciated  Neuro: AAO x 3, answers questions appropriately, no FND, gait unremarkable     Assessment/Plan     Assessment & Plan  Rectal mass  Main concern today, awaiting pathology results   Recommended MRI and lower EUS that is pending.        Progressive avascular necrosis of lunate (Multi)  To followup with hand specialist        HTN (hypertension), benign  Only on hydrochlorothiazide, BP low on olmesartan/amlodipine, believes sx from amlodipine  Reintroduce olmesartan 20mg.   Continue home BP monitoring.   Orders:    olmesartan (BENIcar) 20 mg tablet; Take 1 tablet (20 mg) by mouth once daily.    Exertional shortness of breath  With PFTs showing reversible obstruction suggestive of asthma.   Given severity, will start LABA/ICS, albuterol only for breakthrough (has not needed to use it this past week).   Counseling provided on appropriate administration, to review with pharmacist as well.   Orders:    fluticasone propion-salmeteroL (Advair Diskus) 250-50 mcg/dose diskus inhaler; Inhale 1 puff 2 times a day. Rinse mouth with water after use to reduce aftertaste and incidence of candidiasis. Do not swallow.    Osteopenia, unspecified location  With elevated FRAX, to discuss in greater detail in future visit.        Dyslipidemia  With CAC >400, would pursue more  aggressive risk reduction strategy, uptitrate atorvastatin to 40mg   Orders:    atorvastatin (Lipitor) 40 mg tablet; Take 1 tablet (40 mg) by mouth once daily.    Dyspnea on exertion  EKG, stress testing as previously recommended in high risk patient.   Orders:    ECG 12 lead (Clinic Performed)    Transthoracic Echo (TTE) Complete; Future    Echocardiogram Stress Test; Future      Health Maintenance  Cancer screening:   -Colonoscopy after positive Cologuard 10/2: Rectal mass 1 to 3 cm from the anal verge biopsied, 50 mm nonbleeding polyp in the distal rectum, two 6 to 10 mm polyps in the transverse colon, two 4 to 15 mm polyps in the ascending colon and the cecum repeat 1 year  - Mammogram 9/24  - Pap smear N/A  - Skin cancer prevention strategies reviewed.   Immunizations: Flu declined, COVID in December (pre contemplative)  shingrix UTD, UTD with pneumococcal vaccine, due for RSV vaccination at the pharmacy   DEXA as above 9/24    Followup every 3 months

## 2024-10-10 NOTE — ASSESSMENT & PLAN NOTE
With PFTs showing reversible obstruction suggestive of asthma.   Given severity, will start LABA/ICS, albuterol only for breakthrough (has not needed to use it this past week).   Counseling provided on appropriate administration, to review with pharmacist as well.   Orders:    fluticasone propion-salmeteroL (Advair Diskus) 250-50 mcg/dose diskus inhaler; Inhale 1 puff 2 times a day. Rinse mouth with water after use to reduce aftertaste and incidence of candidiasis. Do not swallow.

## 2024-10-10 NOTE — PATIENT INSTRUCTIONS
Jesi,   I have ordered blood and/or urine tests for you to do today. The lab can be found on this floor (2nd floor) next to the pharmacy across from the elevators.    Blood pressure - take hydrochlorothiazide + olmesartan 20mg   Continue to measure home blood pressure readings  Can take losartan 50mg until you get the prescription   Breathing - start ADVAIR twice per day. Hopefully you will not need to use the albuterol inhaler   I have ordered an echocardiogram to better evaluate your heart. Please stop by the cardiology department on the third floor or call [phone] to schedule this study.  Stress test also ordered  RSV vaccine at the pharmacy   Take 1000 units of Vitamin D3 daily   Increase atorvastatin to 40mg at bedtime     Folllowup 3 months

## 2024-10-10 NOTE — ASSESSMENT & PLAN NOTE
With CAC >400, would pursue more aggressive risk reduction strategy, uptitrate atorvastatin to 40mg   Orders:    atorvastatin (Lipitor) 40 mg tablet; Take 1 tablet (40 mg) by mouth once daily.

## 2024-10-10 NOTE — ASSESSMENT & PLAN NOTE
Only on hydrochlorothiazide, BP low on olmesartan/amlodipine, believes sx from amlodipine  Reintroduce olmesartan 20mg.   Continue home BP monitoring.   Orders:    olmesartan (BENIcar) 20 mg tablet; Take 1 tablet (20 mg) by mouth once daily.

## 2024-10-17 ENCOUNTER — TELEPHONE (OUTPATIENT)
Dept: GASTROENTEROLOGY | Facility: CLINIC | Age: 69
End: 2024-10-17
Payer: MEDICARE

## 2024-10-17 ENCOUNTER — APPOINTMENT (OUTPATIENT)
Dept: RADIOLOGY | Facility: CLINIC | Age: 69
End: 2024-10-17
Payer: MEDICARE

## 2024-10-21 ENCOUNTER — TELEPHONE (OUTPATIENT)
Dept: GASTROENTEROLOGY | Facility: CLINIC | Age: 69
End: 2024-10-21
Payer: MEDICARE

## 2024-10-21 NOTE — TELEPHONE ENCOUNTER
Rosendo Mora,  This patient left a message on my voicemail that she hasn't heard anything regarding her results from 10/2/24 Colonoscopy. If you or Dominique could reach out to the patient.      Thanks so much,    Radha

## 2024-10-23 LAB
LABORATORY COMMENT REPORT: NORMAL
PATH REPORT.COMMENTS IMP SPEC: NORMAL
PATH REPORT.FINAL DX SPEC: NORMAL
PATH REPORT.GROSS SPEC: NORMAL
PATH REPORT.TOTAL CANCER: NORMAL

## 2024-10-24 ENCOUNTER — OFFICE VISIT (OUTPATIENT)
Dept: GASTROENTEROLOGY | Facility: CLINIC | Age: 69
End: 2024-10-24
Payer: MEDICARE

## 2024-10-24 DIAGNOSIS — D49.0 NEOPLASM OF UNSPECIFIED BEHAVIOR OF DIGESTIVE SYSTEM: Primary | ICD-10-CM

## 2024-10-24 PROCEDURE — 99214 OFFICE O/P EST MOD 30 MIN: CPT | Performed by: INTERNAL MEDICINE

## 2024-10-24 NOTE — PROGRESS NOTES
Primary Care Provider: Harriet Billingsley DO  Referring Provider: No ref. provider found  My final recommendations will be communicated back to the referring physician and/or primary care provider via shared medical records or via US mail.    Informed consent to proceed with audiovisual telehealth visit was obtained from the patient. The patient is aware of the risks and benefits of participating in a telehealth encounter. Further, the patient understands that services will be billed to insurance for this telehealth encounter and they also understand they would be responsible for any copayments/ deductibles per their plan. The patient confirmed this. The encounter took place with patient at his/her home (pt's  also present) and the physician at the physician's office. The encounter was performed using [employer].    Chief Complaint (Reason for visit):   Alicia Burris is a 69 y.o. 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 Morin in 11/2024  I have discussed the patient's care with Dr. Morin, Dr. Crane and the pathologist           Aj Mora MD, MS  10/24/2024    SUBJECTIVE   HPI: History obtained from patient    Open access Cscope for + cologuard  2 cm Rectal mass and 5 cm rectal polyp, amongst other polyps    Path reviewed    Past Medical History:   Diagnosis Date    Breast cancer (Multi)     right breast    Personal history of irradiation     right breast    Personal history of malignant neoplasm of breast     History of malignant neoplasm of breast    Personal history of other diseases of the circulatory system     History of hypertension    Personal history of other diseases of the circulatory system 04/18/2016    History of hypertension    Personal history of urinary (tract) infections 09/09/2016     History of urinary tract infection       Past Surgical History:   Procedure Laterality Date    BREAST LUMPECTOMY Right 04/18/2016    OTHER SURGICAL HISTORY  11/30/2022    Ankle surgery    OTHER SURGICAL HISTORY  11/30/2022    Ankle arthroplasty    SENTINEL LYMPH NODE BIOPSY Right 04/18/2016       Current Outpatient Medications   Medication Sig Dispense Refill    albuterol (ProAir HFA) 90 mcg/actuation inhaler Inhale 2 puffs every 6 hours if needed for wheezing. 18 g 11    atorvastatin (Lipitor) 40 mg tablet Take 1 tablet (40 mg) by mouth once daily. 90 tablet 3    cholecalciferol, vitamin D3, 75 mcg (3,000 unit) tablet Take by mouth once daily.      fluticasone propion-salmeteroL (Advair Diskus) 250-50 mcg/dose diskus inhaler Inhale 1 puff 2 times a day. Rinse mouth with water after use to reduce aftertaste and incidence of candidiasis. Do not swallow. 60 each 11    hydroCHLOROthiazide (HYDRODiuril) 25 mg tablet TAKE 1 TABLET BY MOUTH ONCE  DAILY 90 tablet 3    ketoconazole (NIZOral) 2 % shampoo       meloxicam (Mobic) 15 mg tablet TAKE 1 TABLET BY MOUTH ONCE  DAILY DAILY 100 tablet 2    olmesartan (BENIcar) 20 mg tablet Take 1 tablet (20 mg) by mouth once daily. 90 tablet 1    triamcinolone (Kenalog) 0.1 % cream        No current facility-administered medications for this visit.       Allergies   Allergen Reactions    Lisinopril Other     TINGLING IN UPPER EXTREMITY     OBJECTIVE   PHYSICAL EXAM:  LMP  (LMP Unknown)      LABS/IMAGING/SCOPES  Lab Results   Component Value Date    WBC 7.6 09/17/2024    HGB 11.6 (L) 09/17/2024    HCT 34.3 (L) 09/17/2024     (H) 09/17/2024     09/17/2024     Lab Results   Component Value Date    GLUCOSE 123 (H) 09/17/2024    CALCIUM 10.1 09/17/2024     09/17/2024    K 4.2 09/17/2024    CO2 31 09/17/2024    CL 97 (L) 09/17/2024    BUN 7 09/17/2024    CREATININE 0.71 09/17/2024     Lab Results   Component Value Date    ALT 7 09/17/2024    AST 19 09/17/2024    ALKPHOS  98 09/17/2024    BILITOT 0.5 09/17/2024       === 09/17/24 ===    CT LUNG SCREENING LOW DOSE    - Impression -  1.  Few small bilateral noncalcified pulmonary nodules measuring up  to 3 mm, as described above. Continued screening with low-dose  noncontrast chest CT in 12 months (from current date) is recommended.  2. Mild upper lung predominant emphysema.  3. Estimated coronary artery calcium score is 420* which correlates  with at least 88th percentile rank as compared to matched MANJARREZ-study  subjects(https://www.manjarrez-nhlbi.org/Calcium/input.aspx).  4. Mild circumferential thickening of the distal esophagus wall.  Correlate with esophagitis and reflux.    LUNG RADS CATEGORY:  Lung Rad: Lung-RADS 2 (Benign Appearance or Indolent Behavior)    Recommendation: Continue annual screening with Low Dose Chest CT in  12 months, recommended as per American College of Radiology  Guidelines Lung-RADS Version 2022.        **The patient's CAC score was measured with an FDA-cleared AI tool  that correlates well with traditional methods. However, due to the  non-gated CT scan and new algorithm, AI-powered scores should not  replace traditional cardiovascular risk assessment. For further  assistance, refer to the [facility] Cardiovascular Prevention  Program via an [facility] referral to 'Cardiology Prevention Program.'    MACRO:  None    Signed by: Nirav Mcclure 9/18/2024 7:48 AM  Dictation workstation:   [id number]    Aj Mora MD, MS  10/24/2024

## 2024-10-24 NOTE — RESULT ENCOUNTER NOTE
Dear Ms. Burris:    During your colonoscopy, polyp(s) were seen, removed, and sent to pathology for evaluation. Most of the polyps has come back as benign or pre-cancerous polyps. One specimen shows high grade dysplasia meaning advanced pre-cancerous changes. There are no invasive cancerous changes on any of the specimens.     I would like to meet with you and discuss further management options. My office will call you and schedule an office visit or televisit to discuss management options.     Sincerely,    Aj Mora MD [facility]  [phone], option 6

## 2024-10-31 ENCOUNTER — APPOINTMENT (OUTPATIENT)
Dept: PRIMARY CARE | Facility: CLINIC | Age: 69
End: 2024-10-31
Payer: MEDICARE

## 2024-11-08 ENCOUNTER — OFFICE VISIT (OUTPATIENT)
Dept: ORTHOPEDIC SURGERY | Facility: HOSPITAL | Age: 69
End: 2024-11-08
Payer: MEDICARE

## 2024-11-08 ENCOUNTER — HOSPITAL ENCOUNTER (OUTPATIENT)
Dept: RADIOLOGY | Facility: HOSPITAL | Age: 69
Discharge: HOME | End: 2024-11-08
Payer: MEDICARE

## 2024-11-08 DIAGNOSIS — K62.89 RECTAL MASS: ICD-10-CM

## 2024-11-08 DIAGNOSIS — M93.1 KIENBOCK'S DISEASE OF LUNATE BONE OF RIGHT WRIST IN ADULT: ICD-10-CM

## 2024-11-08 DIAGNOSIS — M25.531 RIGHT WRIST PAIN: Primary | ICD-10-CM

## 2024-11-08 PROCEDURE — A9575 INJ GADOTERATE MEGLUMI 0.1ML: HCPCS | Performed by: INTERNAL MEDICINE

## 2024-11-08 PROCEDURE — 96372 THER/PROPH/DIAG INJ SC/IM: CPT | Performed by: RADIOLOGY

## 2024-11-08 PROCEDURE — 72197 MRI PELVIS W/O & W/DYE: CPT

## 2024-11-08 PROCEDURE — 99214 OFFICE O/P EST MOD 30 MIN: CPT | Performed by: ORTHOPAEDIC SURGERY

## 2024-11-08 PROCEDURE — 2500000004 HC RX 250 GENERAL PHARMACY W/ HCPCS (ALT 636 FOR OP/ED): Mod: JZ | Performed by: RADIOLOGY

## 2024-11-08 PROCEDURE — 2550000001 HC RX 255 CONTRASTS: Performed by: INTERNAL MEDICINE

## 2024-11-08 RX ORDER — GADOTERATE MEGLUMINE 376.9 MG/ML
13 INJECTION INTRAVENOUS
Status: COMPLETED | OUTPATIENT
Start: 2024-11-08 | End: 2024-11-08

## 2024-11-08 NOTE — LETTER
November 25, 2024     Harriet Billingsley DO  [address] [facility] [address]    Patient: Alicia Burris   YOB: 1955   Date of Visit: 11/8/2024       Dear Dr. Harriet Billingsley DO:    Thank you for referring Alicia Burris to me for evaluation. Below are my notes for this consultation.  If you have questions, please do not hesitate to call me. I look forward to following your patient along with you.       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  This does not localize terribly well to the wrist joint itself.  There is a little lucency around the radial component but this has not changed between films, either to my eye or the radiologist.  I do not think this represents chronic infection, though that is a possibility, especially given the history of rectal surgery.  We discussed the option of open biopsy of the wrist to look for evidence of chronic infection, which would require implant removal or lifelong antibiotic suppression to treat.    I think the pain is primarily consistent with a pain syndrome, and I did discuss recommendation for evaluation by Pain Management to see if they have anything to offer medically.    There certainly is some significant STT and CMC arthritis at the base of the thumb as well, though the pain seems more generalized than would be expected from this arthritis alone.  We discussed options for management of the thumb arthritis, notably with use of bigger sized tools and fatter pens, etc.    I reviewed the option of removal of the hardware and conversion to a total wrist fusion, along with the major risks, benefits, and functional  limitations of that operation.  The need for significant bone graft to fill the resection gap when converting a total wrist to a fusion was also discussed.  You would need to quit smoking before a fusion of that size should be attempted.    Follow-up with me with any additional concerns.        HISTORY OF PRESENT ILLNESS       Patient is a 69 y.o. right-hand dominant female retiree, who presents today for evaluation of chronic right wrist pain.  She had Kienbock's disease and was last seen by Dr. Rodriguez in March of 2022, where complete collapse of the lunate and early loss of carpal height was noted.  She ultimately underwent a total wrist arthroplasty using a Reading device from Dr. Quintero on February 17, 2023.  She had some transient improvement of pain and then worsening.  The pain is throughout the hand, distal forearm, and dorsal wrist.  No popping, clicking, or instability.  No recalled interval trauma.  No numbness or tingling.  Pain tends to be burning to sharp in character.  No problems on the left.  No fevers or chills.    She has history of breast cancer and rectal mass excision, but not diabetes or hypothyroid.  She still smokes 8 to 10/day and has 2 alcoholic drinks per day.      REVIEW OF SYSTEMS       A 30-item multi-system Review Of Systems was obtained on today's intake form.  This was reviewed with the patient and is correct.  The pertinent positives and negatives are listed above.  The form has been scanned separately into the medical record.      PHYSICAL EXAM    Constitutional:    Appears stated age. Well-developed and well-nourished female in no acute distress.  Psychiatric:         Pleasant normal mood and affect. Behavior is appropriate for the situation.   Head:                   Normocephalic and atraumatic.  Eyes:                    Pupils are equal and round.  Cardiovascular:  2+ radial and ulnar pulses. Fingers well-perfused.  Respiratory:        Effort normal. No respiratory distress.  Speaking in complete sentences.  Neurologic:       Alert and oriented to person, place, and time.  Skin:                Skin is intact, warm and dry.  Hematologic / Lymphatic:    No lymphedema or lymphangitis.    Extremities / Musculoskeletal:                      Skin of the right hand and wrist is intact with no erythema, ecchymosis, or diffuse swelling.  Well-healed scar consistent with a given surgical history.  Clinically well aligned.  Good composite finger flexion extension.  Wrist is quite stiff with extension 20 degrees and flexion 10 degrees.  Full forearm rotation.  No crepitus.  Good sagittal plane balance.  Tendons are intact individual testing.  Sensation intact to light touch in all distributions.  Capillary refill less than 2 seconds.  No particular dorsal boggy fullness.  Some discomfort with brushing touch over the dorsal hand and wrist out to the mid metacarpal level.  Mild CMC shoulder sign with positive CMC grind.  Tender at the STT as well.  No MP hyperextension collapse.  Good IP flexion extension pull-through with no triggering or de Quervain's signs.      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    Films reviewed from [facility] on a disk brought by the patient demonstrate a well-fixed [place] total wrist arthroplasty.  There is minimal crossing of the fourth CMC from the fixation screw.  Second metacarpal screw is properly targeted.  There is a little trace lucency around the radial component stem but this is unchanged from the previous films.  Implant appears appropriately positioned and appropriately sized.  No fractures.      Past Medical History:   Diagnosis Date   • Breast cancer (Multi)     right breast   • Personal history of irradiation     right breast   • Personal history  of malignant neoplasm of breast     History of malignant neoplasm of breast   • Personal history of other diseases of the circulatory system     History of hypertension   • Personal history of other diseases of the circulatory system 04/18/2016    History of hypertension   • Personal history of urinary (tract) infections 09/09/2016    History of urinary tract infection       Medication Documentation Review Audit       Reviewed by Harriet Billingsley DO (Physician) on 10/10/24 at 1214      Medication Order Taking? Sig Documenting Provider Last Dose Status   albuterol (ProAir HFA) 90 mcg/actuation inhaler [id number]  Inhale 2 puffs every 6 hours if needed for wheezing. Harriet Billingsley DO  Active     Discontinued 10/08/24 1647     Discontinued 10/10/24 1205   atorvastatin (Lipitor) 20 mg tablet [id number]  TAKE 1 TABLET BY MOUTH ONCE  DAILY Harriet Billingsley DO  Active   cholecalciferol, vitamin D3, 75 mcg (3,000 unit) tablet [id number] No Take by mouth once daily. Historical Provider, MD Taking Active      Discontinued 10/10/24 1205   hydroCHLOROthiazide (HYDRODiuril) 25 mg tablet [id number] No TAKE 1 TABLET BY MOUTH ONCE  DAILY Harriet Billingsley DO Taking Active   ketoconazole (NIZOral) 2 % shampoo [id number] No  Historical Provider, MD Taking Active   Discontinued 10/10/24 1205   meloxicam (Mobic) 15 mg tablet [id number] No TAKE 1 TABLET BY MOUTH ONCE  DAILY DAILY Harriet Billingsley DO Taking Active   Discontinued 10/10/24 1205   sod sulf-pot chloride-mag sulf (Sutab) 1.479-0.188- 0.225 gram tablet [id number]  Starting at 6pm open one bottle of pills and fill glass provided with water and drink according to prep sheet. Start the 2nd bottle with directions on prep sheet 5 hours before procedure time Dominick Mello MD  Active   triamcinolone (Kenalog) 0.1 % cream [id number]   Historical Provider, MD  Active                    Allergies   Allergen Reactions   • Lisinopril Other     TINGLING IN UPPER EXTREMITY       Social History      Socioeconomic History   • Marital status:      Spouse name: Not on file   • Number of children: Not on file   • Years of education: Not on file   • Highest education level: Not on file   Occupational History   • Not on file   Tobacco Use   • Smoking status: Every Day     Types: Cigarettes   • Smokeless tobacco: Never   Substance and Sexual Activity   • Alcohol use: Yes     Alcohol/week: 7.0 standard drinks of alcohol     Types: 7 Standard drinks or equivalent per week   • Drug use: Not Currently   • Sexual activity: Not on file   Other Topics Concern   • Not on file   Social History Narrative   • Not on file     Social Drivers of Health     Financial Resource Strain: Not on file   Food Insecurity: Not on file   Transportation Needs: Not on file   Physical Activity: Not on file   Stress: Not on file   Social Connections: Not on file   Intimate Partner Violence: Not on file   Housing Stability: Not on file       Past Surgical History:   Procedure Laterality Date   • BREAST LUMPECTOMY Right 04/18/2016   • OTHER SURGICAL HISTORY  11/30/2022    Ankle surgery   • OTHER SURGICAL HISTORY  11/30/2022    Ankle arthroplasty   • SENTINEL LYMPH NODE BIOPSY Right 04/18/2016         Electronically Signed      MAGGI Malcolm MD      Orthopaedic Hand Surgery      [phone]

## 2024-11-08 NOTE — PROGRESS NOTES
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  This does not localize terribly well to the wrist joint itself.  There is a little lucency around the radial component but this has not changed between films, either to my eye or the radiologist.  I do not think this represents chronic infection, though that is a possibility, especially given the history of rectal surgery.  We discussed the option of open biopsy of the wrist to look for evidence of chronic infection, which would require implant removal or lifelong antibiotic suppression to treat.    I think the pain is primarily consistent with a pain syndrome, and I did discuss recommendation for evaluation by Pain Management to see if they have anything to offer medically.    There certainly is some significant STT and CMC arthritis at the base of the thumb as well, though the pain seems more generalized than would be expected from this arthritis alone.  We discussed options for management of the thumb arthritis, notably with use of bigger sized tools and fatter pens, etc.    I reviewed the option of removal of the hardware and conversion to a total wrist fusion, along with the major risks, benefits, and functional limitations of that operation.  The need for significant bone graft to fill the resection gap when converting a total wrist to a fusion was also discussed.  You would need to quit smoking before a fusion of that size should be attempted.    Follow-up with me with any additional concerns.        HISTORY OF PRESENT ILLNESS       Patient is a 69 y.o. right-hand dominant female retiree, who presents today for evaluation of chronic right wrist pain.  She had Kienbock's disease and was last seen by Dr. Rodriguez in March of 2022, where complete collapse of the lunate and early loss of  carpal height was noted.  She ultimately underwent a total wrist arthroplasty using a Amrita device from Dr. Quintero on February 17, 2023.  She had some transient improvement of pain and then worsening.  The pain is throughout the hand, distal forearm, and dorsal wrist.  No popping, clicking, or instability.  No recalled interval trauma.  No numbness or tingling.  Pain tends to be burning to sharp in character.  No problems on the left.  No fevers or chills.    She has history of breast cancer and rectal mass excision, but not diabetes or hypothyroid.  She still smokes 8 to 10/day and has 2 alcoholic drinks per day.      REVIEW OF SYSTEMS       A 30-item multi-system Review Of Systems was obtained on today's intake form.  This was reviewed with the patient and is correct.  The pertinent positives and negatives are listed above.  The form has been scanned separately into the medical record.      PHYSICAL EXAM    Constitutional:    Appears stated age. Well-developed and well-nourished female in no acute distress.  Psychiatric:         Pleasant normal mood and affect. Behavior is appropriate for the situation.   Head:                   Normocephalic and atraumatic.  Eyes:                    Pupils are equal and round.  Cardiovascular:  2+ radial and ulnar pulses. Fingers well-perfused.  Respiratory:        Effort normal. No respiratory distress. Speaking in complete sentences.  Neurologic:       Alert and oriented to person, place, and time.  Skin:                Skin is intact, warm and dry.  Hematologic / Lymphatic:    No lymphedema or lymphangitis.    Extremities / Musculoskeletal:                      Skin of the right hand and wrist is intact with no erythema, ecchymosis, or diffuse swelling.  Well-healed scar consistent with a given surgical history.  Clinically well aligned.  Good composite finger flexion extension.  Wrist is quite stiff with extension 20 degrees and flexion 10 degrees.  Full forearm rotation.   No crepitus.  Good sagittal plane balance.  Tendons are intact individual testing.  Sensation intact to light touch in all distributions.  Capillary refill less than 2 seconds.  No particular dorsal boggy fullness.  Some discomfort with brushing touch over the dorsal hand and wrist out to the mid metacarpal level.  Mild CMC shoulder sign with positive CMC grind.  Tender at the STT as well.  No MP hyperextension collapse.  Good IP flexion extension pull-through with no triggering or de Quervain's signs.      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    Films reviewed from [facility] on a disk brought by the patient demonstrate a well-fixed [place] total wrist arthroplasty.  There is minimal crossing of the fourth CMC from the fixation screw.  Second metacarpal screw is properly targeted.  There is a little trace lucency around the radial component stem but this is unchanged from the previous films.  Implant appears appropriately positioned and appropriately sized.  No fractures.      Past Medical History:   Diagnosis Date    Breast cancer (Multi)     right breast    Personal history of irradiation     right breast    Personal history of malignant neoplasm of breast     History of malignant neoplasm of breast    Personal history of other diseases of the circulatory system     History of hypertension    Personal history of other diseases of the circulatory system 04/18/2016    History of hypertension    Personal history of urinary (tract) infections 09/09/2016    History of urinary tract infection       Medication Documentation Review Audit       Reviewed by Harriet Billingsley DO (Physician) on 10/10/24 at 1214      Medication Order Taking? Sig Documenting Provider Last Dose Status   albuterol (ProAir HFA) 90  mcg/actuation inhaler [id number]  Inhale 2 puffs every 6 hours if needed for wheezing. Harriet FARLEY Jose Cruz,   Active     Discontinued 10/08/24 1647     Discontinued 10/10/24 1205   atorvastatin (Lipitor) 20 mg tablet [id number]  TAKE 1 TABLET BY MOUTH ONCE  DAILY Harriet FARLEY Jose Cruz, DO  Active   cholecalciferol, vitamin D3, 75 mcg (3,000 unit) tablet [id number] No Take by mouth once daily. Historical Provider, MD Taking Active      Discontinued 10/10/24 1205   hydroCHLOROthiazide (HYDRODiuril) 25 mg tablet [id number] No TAKE 1 TABLET BY MOUTH ONCE  DAILY Harriet M Jose Cruz,  Taking Active   ketoconazole (NIZOral) 2 % shampoo [id number] No  Historical Provider, MD Taking Active   Discontinued 10/10/24 1205   meloxicam (Mobic) 15 mg tablet [id number] No TAKE 1 TABLET BY MOUTH ONCE  DAILY DAILY Harriet FARLEY Jose Cruz, DO Taking Active   Discontinued 10/10/24 1205   sod sulf-pot chloride-mag sulf (Sutab) 1.479-0.188- 0.225 gram tablet [id number]  Starting at 6pm open one bottle of pills and fill glass provided with water and drink according to prep sheet. Start the 2nd bottle with directions on prep sheet 5 hours before procedure time Dominick Mello MD  Active   triamcinolone (Kenalog) 0.1 % cream [id number]   Historical Provider, MD  Active                    Allergies   Allergen Reactions    Lisinopril Other     TINGLING IN UPPER EXTREMITY       Social History     Socioeconomic History    Marital status:      Spouse name: Not on file    Number of children: Not on file    Years of education: Not on file    Highest education level: Not on file   Occupational History    Not on file   Tobacco Use    Smoking status: Every Day     Types: Cigarettes    Smokeless tobacco: Never   Substance and Sexual Activity    Alcohol use: Yes     Alcohol/week: 7.0 standard drinks of alcohol     Types: 7 Standard drinks or equivalent per week    Drug use: Not Currently    Sexual activity: Not on file   Other Topics Concern    Not on file   Social History  Narrative    Not on file     Social Drivers of Health     Financial Resource Strain: Not on file   Food Insecurity: Not on file   Transportation Needs: Not on file   Physical Activity: Not on file   Stress: Not on file   Social Connections: Not on file   Intimate Partner Violence: Not on file   Housing Stability: Not on file       Past Surgical History:   Procedure Laterality Date    BREAST LUMPECTOMY Right 04/18/2016    OTHER SURGICAL HISTORY  11/30/2022    Ankle surgery    OTHER SURGICAL HISTORY  11/30/2022    Ankle arthroplasty    SENTINEL LYMPH NODE BIOPSY Right 04/18/2016         Electronically Signed      MAGGI Malcolm MD      Orthopaedic Hand Surgery      [phone]

## 2024-11-11 DIAGNOSIS — K62.89 RECTAL MASS: Primary | ICD-10-CM

## 2024-11-11 RX ORDER — SOD SULF/POT CHLORIDE/MAG SULF 1.479 G
12 TABLET ORAL SEE ADMIN INSTRUCTIONS
Qty: 24 TABLET | Refills: 0 | Status: SHIPPED | OUTPATIENT
Start: 2024-11-11

## 2024-11-21 ENCOUNTER — HOSPITAL ENCOUNTER (OUTPATIENT)
Dept: GASTROENTEROLOGY | Facility: HOSPITAL | Age: 69
Discharge: HOME | End: 2024-11-21
Payer: MEDICARE

## 2024-11-21 ENCOUNTER — ANESTHESIA (OUTPATIENT)
Dept: GASTROENTEROLOGY | Facility: HOSPITAL | Age: 69
End: 2024-11-21
Payer: MEDICARE

## 2024-11-21 ENCOUNTER — ANESTHESIA EVENT (OUTPATIENT)
Dept: GASTROENTEROLOGY | Facility: HOSPITAL | Age: 69
End: 2024-11-21
Payer: MEDICARE

## 2024-11-21 VITALS
TEMPERATURE: 97.5 F | HEART RATE: 80 BPM | RESPIRATION RATE: 20 BRPM | WEIGHT: 140.87 LBS | SYSTOLIC BLOOD PRESSURE: 107 MMHG | HEIGHT: 64 IN | OXYGEN SATURATION: 96 % | DIASTOLIC BLOOD PRESSURE: 60 MMHG | BODY MASS INDEX: 24.05 KG/M2

## 2024-11-21 DIAGNOSIS — K62.89 RECTAL MASS: ICD-10-CM

## 2024-11-21 PROCEDURE — 3700000002 HC GENERAL ANESTHESIA TIME - EACH INCREMENTAL 1 MINUTE

## 2024-11-21 PROCEDURE — 2500000001 HC RX 250 WO HCPCS SELF ADMINISTERED DRUGS (ALT 637 FOR MEDICARE OP): Performed by: INTERNAL MEDICINE

## 2024-11-21 PROCEDURE — 7100000010 HC PHASE TWO TIME - EACH INCREMENTAL 1 MINUTE

## 2024-11-21 PROCEDURE — 2500000004 HC RX 250 GENERAL PHARMACY W/ HCPCS (ALT 636 FOR OP/ED): Performed by: INTERNAL MEDICINE

## 2024-11-21 PROCEDURE — 7100000009 HC PHASE TWO TIME - INITIAL BASE CHARGE

## 2024-11-21 PROCEDURE — 2720000007 HC OR 272 NO HCPCS

## 2024-11-21 PROCEDURE — 96372 THER/PROPH/DIAG INJ SC/IM: CPT | Performed by: INTERNAL MEDICINE

## 2024-11-21 PROCEDURE — 3700000001 HC GENERAL ANESTHESIA TIME - INITIAL BASE CHARGE

## 2024-11-21 PROCEDURE — 2500000004 HC RX 250 GENERAL PHARMACY W/ HCPCS (ALT 636 FOR OP/ED): Performed by: ANESTHESIOLOGIST ASSISTANT

## 2024-11-21 RX ORDER — LIDOCAINE HYDROCHLORIDE 10 MG/ML
0.1 INJECTION, SOLUTION EPIDURAL; INFILTRATION; INTRACAUDAL; PERINEURAL ONCE
Status: DISCONTINUED | OUTPATIENT
Start: 2024-11-21 | End: 2024-11-22 | Stop reason: HOSPADM

## 2024-11-21 RX ORDER — OXYCODONE HYDROCHLORIDE 5 MG/1
5 TABLET ORAL ONCE
Status: COMPLETED | OUTPATIENT
Start: 2024-11-21 | End: 2024-11-21

## 2024-11-21 RX ORDER — LABETALOL HYDROCHLORIDE 5 MG/ML
5 INJECTION, SOLUTION INTRAVENOUS ONCE AS NEEDED
Status: DISCONTINUED | OUTPATIENT
Start: 2024-11-21 | End: 2024-11-22 | Stop reason: HOSPADM

## 2024-11-21 RX ORDER — EPINEPHRINE 0.1 MG/ML
INJECTION INTRACARDIAC; INTRAVENOUS AS NEEDED
Status: COMPLETED | OUTPATIENT
Start: 2024-11-21 | End: 2024-11-21

## 2024-11-21 RX ORDER — ESMOLOL HYDROCHLORIDE 10 MG/ML
INJECTION INTRAVENOUS AS NEEDED
Status: DISCONTINUED | OUTPATIENT
Start: 2024-11-21 | End: 2024-11-21

## 2024-11-21 RX ORDER — FENTANYL CITRATE 50 UG/ML
INJECTION, SOLUTION INTRAMUSCULAR; INTRAVENOUS AS NEEDED
Status: DISCONTINUED | OUTPATIENT
Start: 2024-11-21 | End: 2024-11-21

## 2024-11-21 RX ORDER — PROPOFOL 10 MG/ML
INJECTION, EMULSION INTRAVENOUS AS NEEDED
Status: DISCONTINUED | OUTPATIENT
Start: 2024-11-21 | End: 2024-11-21

## 2024-11-21 RX ORDER — HYDRALAZINE HYDROCHLORIDE 20 MG/ML
INJECTION INTRAMUSCULAR; INTRAVENOUS AS NEEDED
Status: DISCONTINUED | OUTPATIENT
Start: 2024-11-21 | End: 2024-11-21

## 2024-11-21 RX ORDER — ONDANSETRON HYDROCHLORIDE 2 MG/ML
INJECTION, SOLUTION INTRAVENOUS AS NEEDED
Status: DISCONTINUED | OUTPATIENT
Start: 2024-11-21 | End: 2024-11-21

## 2024-11-21 RX ORDER — ONDANSETRON HYDROCHLORIDE 2 MG/ML
4 INJECTION, SOLUTION INTRAVENOUS ONCE AS NEEDED
Status: DISCONTINUED | OUTPATIENT
Start: 2024-11-21 | End: 2024-11-22 | Stop reason: HOSPADM

## 2024-11-21 RX ORDER — MIDAZOLAM HYDROCHLORIDE 1 MG/ML
INJECTION INTRAMUSCULAR; INTRAVENOUS AS NEEDED
Status: DISCONTINUED | OUTPATIENT
Start: 2024-11-21 | End: 2024-11-21

## 2024-11-21 SDOH — HEALTH STABILITY: MENTAL HEALTH: CURRENT SMOKER: 1

## 2024-11-21 ASSESSMENT — PAIN SCALES - GENERAL
PAINLEVEL_OUTOF10: 0 - NO PAIN
PAINLEVEL_OUTOF10: 5 - MODERATE PAIN
PAINLEVEL_OUTOF10: 2
PAINLEVEL_OUTOF10: 5 - MODERATE PAIN
PAINLEVEL_OUTOF10: 8
PAINLEVEL_OUTOF10: 5 - MODERATE PAIN
PAIN_LEVEL: 0
PAINLEVEL_OUTOF10: 5 - MODERATE PAIN
PAINLEVEL_OUTOF10: 0 - NO PAIN
PAINLEVEL_OUTOF10: 0 - NO PAIN
PAINLEVEL_OUTOF10: 6
PAINLEVEL_OUTOF10: 8
PAINLEVEL_OUTOF10: 6

## 2024-11-21 ASSESSMENT — PAIN - FUNCTIONAL ASSESSMENT
PAIN_FUNCTIONAL_ASSESSMENT: 0-10

## 2024-11-21 ASSESSMENT — ENCOUNTER SYMPTOMS: CONSTITUTIONAL NEGATIVE: 1

## 2024-11-21 ASSESSMENT — COLUMBIA-SUICIDE SEVERITY RATING SCALE - C-SSRS
2. HAVE YOU ACTUALLY HAD ANY THOUGHTS OF KILLING YOURSELF?: NO
6. HAVE YOU EVER DONE ANYTHING, STARTED TO DO ANYTHING, OR PREPARED TO DO ANYTHING TO END YOUR LIFE?: NO
1. IN THE PAST MONTH, HAVE YOU WISHED YOU WERE DEAD OR WISHED YOU COULD GO TO SLEEP AND NOT WAKE UP?: NO

## 2024-11-21 ASSESSMENT — PAIN DESCRIPTION - ORIENTATION: ORIENTATION: LOWER

## 2024-11-21 ASSESSMENT — PAIN DESCRIPTION - LOCATION: LOCATION: OTHER (COMMENT)

## 2024-11-21 NOTE — DISCHARGE INSTRUCTIONS

## 2024-11-21 NOTE — ANESTHESIA POSTPROCEDURE EVALUATION
Patient: Alicia Burris    Procedure Summary       Date: 11/21/24 Room / Location: [facility] [place]    Anesthesia Start: 0901 Anesthesia Stop: 1137    Procedure: ENDOSCOPIC ULTRASOUND (LOWER) Diagnosis: Rectal mass    Scheduled Providers: Dominick Morin DO; Carl Ferraro DO; JAYDON Nicolas; Keshav Blum RN; Caro Morales RN Responsible Provider: Carl Ferraro DO    Anesthesia Type: MAC ASA Status: 3            Anesthesia Type: MAC    Vitals Value Taken Time   BP 86/58 11/21/24 1133   Temp 36.4 °C (97.5 °F) 11/21/24 1133   Pulse 81 11/21/24 1133   Resp 17 11/21/24 1133   SpO2 98 % 11/21/24 1133       Anesthesia Post Evaluation    Patient location during evaluation: PACU  Patient participation: complete - patient participated  Level of consciousness: awake and alert  Pain score: 0  Pain management: adequate  Airway patency: patent  Cardiovascular status: acceptable  Respiratory status: acceptable  Hydration status: acceptable  Postoperative Nausea and Vomiting: none        No notable events documented.

## 2024-11-21 NOTE — H&P
History Of Present Illness  Alicia Burris is a 69 y.o. female presenting with two large rectal neoplasms found on her first screening colonoscopy.  The biopsies are adenomatous with HGD and MRI showed no invasive disease.     Past Medical History  Past Medical History:   Diagnosis Date    Breast cancer (Multi)     right breast    Personal history of irradiation     right breast    Personal history of malignant neoplasm of breast     History of malignant neoplasm of breast    Personal history of other diseases of the circulatory system     History of hypertension    Personal history of other diseases of the circulatory system 04/18/2016    History of hypertension    Personal history of urinary (tract) infections 09/09/2016    History of urinary tract infection     Surgical History  Past Surgical History:   Procedure Laterality Date    BREAST LUMPECTOMY Right 04/18/2016    OTHER SURGICAL HISTORY  11/30/2022    Ankle surgery    OTHER SURGICAL HISTORY  11/30/2022    Ankle arthroplasty    SENTINEL LYMPH NODE BIOPSY Right 04/18/2016     Social History  She reports that she has been smoking cigarettes. She has never used smokeless tobacco. She reports current alcohol use of about 7.0 standard drinks of alcohol per week. She reports current drug use. Drug: Marijuana.    Family History  Family History   Problem Relation Name Age of Onset    Alzheimer's disease Mother      Other (HTN) Mother      Alzheimer's disease Father      Breast cancer Sister      Breast cancer Sister      Other (CANCER-EYE DISEASE) Other FH         Allergies  Allergies   Allergen Reactions    Lisinopril Other     TINGLING IN UPPER EXTREMITY     Review of Systems   Constitutional: Negative.         Physical Exam  Constitutional:       Appearance: Normal appearance.   Cardiovascular:      Rate and Rhythm: Normal rate and regular rhythm.   Pulmonary:      Effort: Pulmonary effort is normal.      Breath sounds: Normal breath sounds.   Abdominal:       "Palpations: Abdomen is soft.   Neurological:      Mental Status: She is alert.   Psychiatric:         Mood and Affect: Mood normal.         Behavior: Behavior normal.         Thought Content: Thought content normal.         Judgment: Judgment normal.          Last Recorded Vitals  Blood pressure 174/86, pulse 94, temperature 36 °C (96.8 °F), temperature source Temporal, resp. rate 17, height 1.626 m (5' 4\"), weight 63.9 kg (140 lb 14 oz), SpO2 96%, not currently breastfeeding.    Assessment/Plan   EUS with possible ESD vs EMR     Dominick Morin, DO  "

## 2024-11-21 NOTE — ANESTHESIA PREPROCEDURE EVALUATION
"Patient: Alicia Burris    Procedure Information       Anesthesia Start Date/Time: 11/21/24 0901    Scheduled providers: Dominick Morin DO; Carl Ferraro DO; JAYDON Nicolas; Keshav Blum, HELADIO; Caro Morales, RN    Procedure: ENDOSCOPIC ULTRASOUND (LOWER)    Location: [facility]        HPI: 69 year old female presenting for lower EUS. History significant for HTN, MJ use, smoking, PVD    Denies recent cough, cold, runny nose, fever, flu like symptoms. No exertional angina nor exertional dyspnea. No orthopnea, paroxysmal nocturnal dyspnea, leg swelling. Denies palpitations. No issues with bleeding nor blood clots.    Anesthesia history: no issues    Visit Vitals  /86   Pulse 94   Temp 36 °C (96.8 °F) (Temporal)   Resp 17   Ht 1.626 m (5' 4\")   Wt 63.9 kg (140 lb 14 oz)   LMP  (LMP Unknown)   SpO2 96%   BMI 24.18 kg/m²   OB Status Postmenopausal   Smoking Status Every Day   BSA 1.7 m²        [unfilled]      No results found for this or any previous visit from the past 1095 days.       Encounter Date: 10/10/24   ECG 12 lead (Clinic Performed)    Narrative    Sinus tachycardia   Nonspecific ST abnormality         Stress test: ordered in EMR, no result visible      Relevant Problems   Cardiac   (+) HTN (hypertension), benign   (+) Senile purpura (CMS-HCC)      Pulmonary   (+) Exertional shortness of breath      Neuro   (+) Right median nerve neuropathy      HEENT   (+) Sensorineural hearing loss (SNHL) of both ears      GYN   (+) Malignant neoplasm of central part of female breast       Clinical information reviewed:   Tobacco  Allergies  Meds   Med Hx  Surg Hx  OB Status  Fam Hx  Soc   Hx        NPO Detail:  NPO/Void Status  Date of Last Liquid: 11/21/24  Time of Last Liquid: 0730  Date of Last Solid: 11/19/24  Time of Last Solid: 2300         Physical Exam    Airway  Mallampati: II  TM distance: >3 FB  Neck ROM: full     Cardiovascular   Rhythm: regular  Rate: normal     Dental - " normal exam     Pulmonary   Breath sounds clear to auscultation     Abdominal   (+) obese  Abdomen: soft             Anesthesia Plan    History of general anesthesia?: yes  History of complications of general anesthesia?: no    ASA 3     MAC     The patient is a current smoker.  Patient was previously instructed to abstain from smoking on day of procedure.  Patient did not smoke on day of procedure.    intravenous induction   Anesthetic plan and risks discussed with patient.  Use of blood products discussed with patient who consented to blood products.    Plan discussed with CAA.

## 2024-11-21 NOTE — NURSING NOTE
1313- Dr. Ferraro at bedside at this time assessing patient's Bps. Dr. Ferraro states patient's Bps are okay even though not within 20% of baseline; states patient is clear to discharge. Patient did receive hydralazine in the procedure.     1322- Dr. Morin at bedside speaking with patient and patient's  again. States clear to discharge.     1329-IV removed at this time with IV catheter tip intact upon removal    1330- Patient helped to get dressed at this time     1338- Patient assisted to bathroom at this time     1342- Patient transported to [facility] at this time in stable condition and with all belongings via wheelchair.

## 2024-11-21 NOTE — NURSING NOTE
1313- Dr. Ferraro at bedside at this time. States that is okay with patient's pressures not within 20%. Patient did receive hydralazine intra-procedure. Dr. Ferraro told patient to not take her BP meds today.

## 2024-11-23 PROBLEM — M25.531 RIGHT WRIST PAIN: Status: ACTIVE | Noted: 2024-11-23

## 2024-12-12 ENCOUNTER — TELEPHONE (OUTPATIENT)
Dept: PRIMARY CARE | Facility: CLINIC | Age: 69
End: 2024-12-12

## 2025-01-10 ENCOUNTER — OFFICE VISIT (OUTPATIENT)
Dept: HEMATOLOGY/ONCOLOGY | Facility: CLINIC | Age: 70
End: 2025-01-10
Payer: MEDICARE

## 2025-01-10 VITALS
TEMPERATURE: 97.4 F | OXYGEN SATURATION: 97 % | DIASTOLIC BLOOD PRESSURE: 94 MMHG | WEIGHT: 146.16 LBS | HEART RATE: 101 BPM | SYSTOLIC BLOOD PRESSURE: 163 MMHG | BODY MASS INDEX: 25.09 KG/M2

## 2025-01-10 DIAGNOSIS — Z85.3 HISTORY OF RIGHT BREAST CANCER: Primary | ICD-10-CM

## 2025-01-10 DIAGNOSIS — R23.4 BREAST SKIN CHANGES: ICD-10-CM

## 2025-01-10 PROCEDURE — 99202 OFFICE O/P NEW SF 15 MIN: CPT | Performed by: NURSE PRACTITIONER

## 2025-01-10 PROCEDURE — 99212 OFFICE O/P EST SF 10 MIN: CPT | Performed by: NURSE PRACTITIONER

## 2025-01-10 PROCEDURE — 3080F DIAST BP >= 90 MM HG: CPT | Performed by: NURSE PRACTITIONER

## 2025-01-10 PROCEDURE — 1126F AMNT PAIN NOTED NONE PRSNT: CPT | Performed by: NURSE PRACTITIONER

## 2025-01-10 PROCEDURE — 1160F RVW MEDS BY RX/DR IN RCRD: CPT | Performed by: NURSE PRACTITIONER

## 2025-01-10 PROCEDURE — 1159F MED LIST DOCD IN RCRD: CPT | Performed by: NURSE PRACTITIONER

## 2025-01-10 PROCEDURE — 3077F SYST BP >= 140 MM HG: CPT | Performed by: NURSE PRACTITIONER

## 2025-01-10 ASSESSMENT — PATIENT HEALTH QUESTIONNAIRE - PHQ9
1. LITTLE INTEREST OR PLEASURE IN DOING THINGS: NOT AT ALL
2. FEELING DOWN, DEPRESSED OR HOPELESS: NOT AT ALL
SUM OF ALL RESPONSES TO PHQ9 QUESTIONS 1 & 2: 0

## 2025-01-10 ASSESSMENT — PAIN SCALES - GENERAL: PAINLEVEL_OUTOF10: 0-NO PAIN

## 2025-01-10 NOTE — PATIENT INSTRUCTIONS
Your exam is negative.  You are experiencing changes to your skin from radiation - the thickening and hypopigmentation (light skin). I do not think this is new though it may evolve over time. After reviewing your mammogram, it is mentioned there as well.  Thank you for coming to see me today. I am happy to see you if you have any future concerns. Continue your mammogram and oncology follow up with Dr. Billingsley.  Thank you for choosing [facility] for your care.

## 2025-01-10 NOTE — PROGRESS NOTES
Oncology Follow-Up    Alicia Burris  [id number]       Staging: Stage IIA, T2 N0 M0     Cancer History:      Treatment History:    1. Right ultrasound-guided core biopsy December 9, 2010, which showed an invasive ductal carcinoma, modified Mercado-Martell grade 2,  ER, GA, both 90% and HER-2 negative. She ultimately was treated with partial mastectomy and sentinel lymph node biopsy January 14, 2011, which showed 0 of 3 sentinel lymph nodes. Infiltrating ductal  carcinoma measured 2.1 cm. Mercado-Martell grade 1, with clear margins. Oncotype DX recurrence score was low at 13. Stage IIA, T2 N0 M0 .   2. Radiation therapy to the right breast February 23, 2011, through March 16, 2011, for a total of 42.56 Gy.   3. Arimidex initiated April 2011 stopped on June 28, 2011, for neuropathy-like symptoms in her arms and hands. The symptoms resolved with  cessation of the Arimidex. Aromasin was initiated June 29, 2011, and stopped September 2011 for arthritic symptoms in her knees and hands. These symptoms did not improve with cessation of the Aromasin. Therefore, the Aromasin was re-initiated at the end of October in 2011 and discontinued March 2020.      Subjective    Alicia presents for an urgent visit. She has a remote history of right breast cancer. Alicia reports no new health changes. She had her first colonoscopy that required follow up. She continues to smoke though states that she has cut back.         Objective      Vitals:    01/10/25 1412   BP: (!) 163/94   Pulse: 101   Temp: 36.3 °C (97.4 °F)   SpO2: 97%        Constitutional: Well developed, alert/oriented x3, no distress, cooperative   Eyes: clear sclera   ENMT: mucous membranes moist, no apparent lesions   Head/Neck: Neck supple, no bruits   Respiratory/Thorax: Patent airways, decreased lung sounds bilaterally lower and right mid-lobe.    Cardiovascular: Regular rate and rhythm, no murmurs, 2+ equal pulses of the extremities,   Gastrointestinal:  Nondistended, soft, non-tender, no masses palpable, no organomegaly   Musculoskeletal: ROM intact, no joint swelling, normal strength   Extremities: normal extremities, no edema, cyanosis, contusions or wounds   Neurological: alert and oriented x3,  normal strength   Breast:     Lymphatic: No significant lymphadenopathy   Psychological: Appropriate mood and behavior   Skin: Warm and dry, no lesions, no rashes      Physical Exam  Chest:          Comments: Right breast positive for breast conserving surgery with well healed UOQ and right axillary incisions; no masses, nodules, skin changes, discharge.  She does have thickened and hypopigmented skin secondary to XRT. Left breast + for breast conserving surgery with well healed incision; no masses, nodules, skin changes, discharge, areola and nipple also with light pigment (naturally).         Lab Results   Component Value Date    WBC 7.6 09/17/2024    HGB 11.6 (L) 09/17/2024    HCT 34.3 (L) 09/17/2024     (H) 09/17/2024     09/17/2024       Chemistry    Lab Results   Component Value Date/Time     09/17/2024 1242    K 4.2 09/17/2024 1242    CL 97 (L) 09/17/2024 1242    CO2 31 09/17/2024 1242    BUN 7 09/17/2024 1242    CREATININE 0.71 09/17/2024 1242    Lab Results   Component Value Date/Time    CALCIUM 10.1 09/17/2024 1242    ALKPHOS 98 09/17/2024 1242    AST 19 09/17/2024 1242    ALT 7 09/17/2024 1242    BILITOT 0.5 09/17/2024 1242         Imaging:    Status Exam Begun Exam Ended   Final 9/30/2024 13:22 9/30/2024 13:34     Study Result    Narrative & Impression   Interpreted By:  Namita Lyn,   STUDY:  BI MAMMO BILATERAL SCREENING TOMOSYNTHESIS;  9/30/2024 1:34 pm      ACCESSION NUMBER(S):  [id number]      ORDERING CLINICIAN:  SELF MAMMOGRAM      INDICATION:  Screening. History of right lumpectomy and radiation. Family history  of breast cancer.      ,Z12.31 Encounter for screening mammogram for malignant neoplasm of  breast       COMPARISON:  09/28/2023, 01/05/2023, 03/28/2022.      FINDINGS:  2D and tomosynthesis images were reviewed at 1 mm slice thickness.      Density:  There are scattered areas of fibroglandular density.      Postoperative scarring and surgical clips are seen in the deep upper  outer right breast. There is right breast skin and trabecular  thickening, consistent with post radiation changes. No suspicious  masses or calcifications are identified.      IMPRESSION:  No mammographic evidence of malignancy.      BI-RADS CATEGORY:  BI-RADS Category:  2 Benign.  Recommendation:  Annual Screening.  Recommended Date:  1 Year.  Laterality:  Bilateral.     Assessment/Plan    Alicia is a 68 yo woman with a remote hx of right IDC G-2 diagnosed December 2010. She is s/p partial mastectomy, SLNB (negative, XRT, and completed aromatase inhibitor therapy in March 2020. She presents today for an urgent appointment as she feels there are changes in the right breast. She states she feels a mass and the pigment is lighter.    Plan:  Exam is negative.  Explained to Alicia that what she is feeling is skin thickening post-radiation. The hypopigmentation is usual after XRT. There is also scarring of the lower portion of the breast. This was also described on her mammogram.  Discussed her decreased lung sounds and strongly encouraged her to stop smoking.   All of Cody's questions/concerns were addressed.  Over 10 minutes of time was spent with this patient with >50% of the time with education, counseling, and coordination of care.   Alicia will continue oncology follow up with Dr. Billingsley. I will see her on an as needed basis.      Diagnoses and all orders for this visit:  History of right breast cancer  Breast skin changes      Jen Torres, APRN-CNP

## 2025-01-14 DIAGNOSIS — I10 ESSENTIAL HYPERTENSION: ICD-10-CM

## 2025-01-15 RX ORDER — HYDROCHLOROTHIAZIDE 25 MG/1
TABLET ORAL
Qty: 100 TABLET | Refills: 2 | Status: SHIPPED | OUTPATIENT
Start: 2025-01-15

## 2025-02-07 ENCOUNTER — APPOINTMENT (OUTPATIENT)
Dept: ORTHOPEDIC SURGERY | Facility: HOSPITAL | Age: 70
End: 2025-02-07
Payer: MEDICARE

## 2025-02-07 DIAGNOSIS — M25.531 RIGHT WRIST PAIN: ICD-10-CM

## 2025-02-07 DIAGNOSIS — M93.1 KIENBOCK'S DISEASE OF LUNATE BONE OF RIGHT WRIST IN ADULT: ICD-10-CM

## 2025-02-24 ENCOUNTER — OFFICE VISIT (OUTPATIENT)
Dept: PAIN MEDICINE | Facility: HOSPITAL | Age: 70
End: 2025-02-24
Payer: MEDICARE

## 2025-02-24 DIAGNOSIS — M93.1 KIENBOCK'S DISEASE OF LUNATE BONE OF RIGHT WRIST IN ADULT: ICD-10-CM

## 2025-02-24 DIAGNOSIS — M19.031 ARTHRITIS OF RIGHT WRIST: Primary | ICD-10-CM

## 2025-02-24 DIAGNOSIS — M25.531 RIGHT WRIST PAIN: ICD-10-CM

## 2025-02-24 PROCEDURE — 99204 OFFICE O/P NEW MOD 45 MIN: CPT | Performed by: PAIN MEDICINE

## 2025-02-24 PROCEDURE — 1125F AMNT PAIN NOTED PAIN PRSNT: CPT | Performed by: PAIN MEDICINE

## 2025-02-24 PROCEDURE — 99214 OFFICE O/P EST MOD 30 MIN: CPT | Performed by: PAIN MEDICINE

## 2025-02-24 RX ORDER — DICLOFENAC SODIUM 10 MG/G
4 GEL TOPICAL 4 TIMES DAILY
Qty: 480 G | Refills: 3 | Status: SHIPPED | OUTPATIENT
Start: 2025-02-24 | End: 2025-06-24

## 2025-02-24 ASSESSMENT — PAIN - FUNCTIONAL ASSESSMENT: PAIN_FUNCTIONAL_ASSESSMENT: 0-10

## 2025-02-24 ASSESSMENT — PAIN DESCRIPTION - DESCRIPTORS: DESCRIPTORS: ACHING;NUMBNESS;TINGLING

## 2025-02-24 ASSESSMENT — PAIN SCALES - GENERAL: PAINLEVEL_OUTOF10: 3

## 2025-02-24 NOTE — PROGRESS NOTES
Subjective   Patient ID: Alicia Burris is a 70 y.o. female who presents for Wrist Pain (right).    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 She describes constant, achy pain in her wrist and thumb with occasional shooting pain along the radial aspect of her wrist. She has a tingling sensation along the dorsal aspect of her right thumb and proximal half of her second digit. She has weakness and increased pain with gripping objects and turning knobs. X-ray of her right wrist demonstrated a thin zone of lucency around the radial component of her arthroplasty as well as moderate-severe osteoarthritis of the first CMC joint and scaphotrapeziotrapezoidal joint. She denies any temperature changes, swelling, or allodynia. She takes meloxicam and tylenol and has tried multiple topicals without improvement. She wears a wrist brace 24/7 for support.    Review of Systems   13-point ROS done and negative except for HPI.       Current Outpatient Medications:     albuterol (ProAir HFA) 90 mcg/actuation inhaler, Inhale 2 puffs every 6 hours if needed for wheezing., Disp: 18 g, Rfl: 11    atorvastatin (Lipitor) 40 mg tablet, Take 1 tablet (40 mg) by mouth once daily., Disp: 90 tablet, Rfl: 3    cholecalciferol, vitamin D3, 75 mcg (3,000 unit) tablet, Take by mouth once daily., Disp: , Rfl:     hydroCHLOROthiazide (HYDRODiuril) 25 mg tablet, TAKE 1 TABLET BY MOUTH ONCE  DAILY, Disp: 100 tablet, Rfl: 2    meloxicam (Mobic) 15 mg tablet, TAKE 1 TABLET BY MOUTH ONCE  DAILY DAILY, Disp: 100 tablet, Rfl: 2    sod sulf-pot chloride-mag sulf (Sutab) 1.479-0.188- 0.225 gram tablet, Take 12 tablets by mouth see administration instructions., Disp: 24 tablet, Rfl: 0     Past Medical History:   Diagnosis Date    Breast cancer (Multi)     right breast    Personal history of irradiation     right breast    Personal history of  malignant neoplasm of breast     History of malignant neoplasm of breast    Personal history of other diseases of the circulatory system     History of hypertension    Personal history of other diseases of the circulatory system 04/18/2016    History of hypertension    Personal history of urinary (tract) infections 09/09/2016    History of urinary tract infection        Past Surgical History:   Procedure Laterality Date    BREAST LUMPECTOMY Right 04/18/2016    OTHER SURGICAL HISTORY  11/30/2022    Ankle surgery    OTHER SURGICAL HISTORY  11/30/2022    Ankle arthroplasty    SENTINEL LYMPH NODE BIOPSY Right 04/18/2016        Family History   Problem Relation Name Age of Onset    Alzheimer's disease Mother      Other (HTN) Mother      Alzheimer's disease Father      Breast cancer Sister      Breast cancer Sister      Other (CANCER-EYE DISEASE) Other FH         Allergies   Allergen Reactions    Lisinopril Other     TINGLING IN UPPER EXTREMITY        Objective     There were no vitals filed for this visit.     Physical Exam      General: NAD, well groomed, well nourished  Eyes: Non-icteric sclera, EOMI  Ears, Nose, Mouth, and Throat: External ears and nose appear to be without deformity or rash. No lesions or masses noted. Hearing is grossly intact.   Neck: Trachea midline  Respiratory: Nonlabored breathing   Cardiovascular: No peripheral edema   Skin: No rashes or open lesions/ulcers identified on skin.  Right wrist/hand: 5/5 strength finger abduction/adduction, 4/5  strength, decreased sensation over dorsal aspect of right thumb and second digit to the first knuckle  Psychiatric: Alert, orientation to person, place, and time. Cooperative.      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 Based on history, available imaging, and physical exam, this does  not appear to be due to a complex regional pain syndrome and is most likely due to arthritis.     Plan:  - TENS unit  - Voltaren gel  - Consider peripheral nerve stimulator of median and ulnar nerves if symptoms fail to improve with conservative management.    Follow up: The patient was invited to contact us back anytime with any questions or concerns and follow-up with us in the office as needed.     Leslie Najera MD  PGY-2, Anesthesiology

## 2025-05-01 RX ORDER — LOSARTAN POTASSIUM 50 MG/1
50 TABLET ORAL DAILY
COMMUNITY

## 2025-05-05 ENCOUNTER — TELEMEDICINE (OUTPATIENT)
Dept: PAIN MEDICINE | Facility: HOSPITAL | Age: 70
End: 2025-05-05
Payer: MEDICARE

## 2025-05-05 DIAGNOSIS — M93.1 KIENBOCK'S DISEASE OF LUNATE BONE OF RIGHT WRIST IN ADULT: Primary | ICD-10-CM

## 2025-05-05 DIAGNOSIS — M19.031 ARTHRITIS OF RIGHT WRIST: ICD-10-CM

## 2025-05-05 DIAGNOSIS — M25.531 RIGHT WRIST PAIN: ICD-10-CM

## 2025-05-05 PROCEDURE — 99212 OFFICE O/P EST SF 10 MIN: CPT | Performed by: PAIN MEDICINE

## 2025-05-05 NOTE — PROGRESS NOTES
Telemedicine Visit    Subjective   Patient ID: Alicia Burris is a 70 y.o. female who continues to c/o persistent right wrist and thumb pain and arthritis symptoms, s/p right total wrist arthroplasty in February 2023.  She failed multiple pharmacological management, and not interested in any more interventions.  At her last visit , we recommended topical NSAIDs and trial of TENS unit.    The TENS unit is helping, 50% relief in general and she is using the unit for about 20 minutes , 3-4 times a day. She denies any irritation from the patches and she is requesting more of these patches.   Topical Voltaren gel and CBD cream are not helpful.        Review of Systems   13-point ROS done and negative except for HPI.       Current Outpatient Medications:     albuterol (ProAir HFA) 90 mcg/actuation inhaler, Inhale 2 puffs every 6 hours if needed for wheezing., Disp: 18 g, Rfl: 11    atorvastatin (Lipitor) 40 mg tablet, Take 1 tablet (40 mg) by mouth once daily., Disp: 90 tablet, Rfl: 3    cholecalciferol, vitamin D3, 75 mcg (3,000 unit) tablet, Take by mouth once daily., Disp: , Rfl:     diclofenac sodium (Voltaren) 1 % gel, Apply 4.5 inches (4 g) topically 4 times a day., Disp: 480 g, Rfl: 3    hydroCHLOROthiazide (HYDRODiuril) 25 mg tablet, TAKE 1 TABLET BY MOUTH ONCE  DAILY, Disp: 100 tablet, Rfl: 2    losartan (Cozaar) 50 mg tablet, Take 1 tablet (50 mg) by mouth once daily., Disp: , Rfl:     meloxicam (Mobic) 15 mg tablet, TAKE 1 TABLET BY MOUTH ONCE  DAILY DAILY, Disp: 100 tablet, Rfl: 2    sod sulf-pot chloride-mag sulf (Sutab) 1.479-0.188- 0.225 gram tablet, Take 12 tablets by mouth see administration instructions., Disp: 24 tablet, Rfl: 0     Past Medical History:   Diagnosis Date    Breast cancer     right breast    Personal history of irradiation     right breast    Personal history of malignant neoplasm of breast     History of malignant neoplasm of breast    Personal history of other diseases of the  circulatory system     History of hypertension    Personal history of other diseases of the circulatory system 04/18/2016    History of hypertension    Personal history of urinary (tract) infections 09/09/2016    History of urinary tract infection    Urinary tract infection         Past Surgical History:   Procedure Laterality Date    BREAST LUMPECTOMY Right 04/18/2016    OTHER SURGICAL HISTORY  11/30/2022    Ankle surgery    OTHER SURGICAL HISTORY  11/30/2022    Ankle arthroplasty    SENTINEL LYMPH NODE BIOPSY Right 04/18/2016        Family History   Problem Relation Name Age of Onset    Alzheimer's disease Mother Matthew Santos I     Other (HTN) Mother Matthew Santos I     Alcohol abuse Mother Matthew Santos I     Alzheimer's disease Father      Breast cancer Sister      Breast cancer Sister      Other (CANCER-EYE DISEASE) Other FH         Allergies   Allergen Reactions    Lisinopril Other     TINGLING IN UPPER EXTREMITY        Objective     Physical Exam    General: NAD, well groomed, well nourished  Face/Eyes: No acute abnormalities  Neck: Supple, good ROM of the C spine  Respiratory: Nonlabored breathing   Neurological: Denies new deficits  Psychiatric: Alert, orientation to person, place, and time. Cooperative.      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   She continues to deny typical symptoms of CRPS, and responding well to conservative therapy and TENS.     Plan:  - TENS supplies  - The patient was educated about red flag symptoms and signs.  Fall risk education.  - Consider peripheral nerve stimulator of median and ulnar nerves if symptoms got worse and not improving with conservative management.    Ronni Castillo MD, PhD

## 2025-05-07 ENCOUNTER — ANESTHESIA EVENT (OUTPATIENT)
Dept: GASTROENTEROLOGY | Facility: HOSPITAL | Age: 70
End: 2025-05-07
Payer: MEDICARE

## 2025-05-07 NOTE — ANESTHESIA PREPROCEDURE EVALUATION
Patient: Alicia Burris    Procedure Information       Date/Time: 05/08/25 1030    Scheduled providers: Dominick Morin DO; Stephen Stubbs MD    Procedure: FLEXIBLE SIGMOIDOSCOPY    [facility]: [facility]            Relevant Problems   Cardiac   (+) HTN (hypertension), benign   (+) Senile purpura      Pulmonary   (+) Exertional shortness of breath      Neuro   (+) Right median nerve neuropathy      HEENT   (+) Sensorineural hearing loss (SNHL) of both ears      GYN   (+) Malignant neoplasm of central part of female breast       Clinical information reviewed:   Tobacco  Allergies  Meds   Med Hx  Surg Hx  OB Status  Fam Hx  Soc   Hx         Medical History[1]   Surgical History[2]  Social History[3]   Current Outpatient Medications   Medication Instructions    albuterol (ProAir HFA) 90 mcg/actuation inhaler 2 puffs, inhalation, Every 6 hours PRN    atorvastatin (LIPITOR) 10 mg, Daily    cholecalciferol, vitamin D3, 75 mcg (3,000 unit) tablet Daily RT    hydroCHLOROthiazide (HYDRODiuril) 25 mg tablet TAKE 1 TABLET BY MOUTH ONCE  DAILY    losartan (COZAAR) 50 mg, Daily    meloxicam (Mobic) 15 mg tablet TAKE 1 TABLET BY MOUTH ONCE  DAILY DAILY    sod sulf-pot chloride-mag sulf (Sutab) 1.479-0.188- 0.225 gram tablet 12 tablets, oral, See admin instructions      RX Allergies[4]     Chemistry    Lab Results   Component Value Date/Time     09/17/2024 1242    K 4.2 09/17/2024 1242    CL 97 (L) 09/17/2024 1242    CO2 31 09/17/2024 1242    BUN 7 09/17/2024 1242    CREATININE 0.71 09/17/2024 1242    Lab Results   Component Value Date/Time    CALCIUM 10.1 09/17/2024 1242    ALKPHOS 98 09/17/2024 1242    AST 19 09/17/2024 1242    ALT 7 09/17/2024 1242    BILITOT 0.5 09/17/2024 1242          Lab Results   Component Value Date    HGBA1C 5.5 01/25/2023     Lab Results   Component Value Date/Time    WBC 7.6 09/17/2024 1242    HGB 11.6 (L) 09/17/2024 1242    HCT 34.3 (L) 09/17/2024 1242     09/17/2024  "1242     No results found for: \"PROTIME\", \"PTT\", \"INR\"  No results found for: \"ABORH\"  No results found for this or any previous visit (from the past 4464 hours).  No results found for this or any previous visit from the past 1095 days.       Visit Vitals  /86   Pulse 93   Temp 36 °C (96.8 °F) (Temporal)   Resp 18   Ht 1.651 m (5' 5\")   Wt 66 kg (145 lb 8.1 oz)   LMP  (LMP Unknown)   SpO2 100%   BMI 24.21 kg/m²   OB Status Postmenopausal   Smoking Status Every Day   BSA 1.74 m²     NPO/Void Status  Date of Last Liquid: 05/08/25  Time of Last Liquid: 0530  Date of Last Solid: 05/06/25  Time of Last Solid: 2000        Physical Exam    Airway  Mallampati: III  TM distance: >3 FB  Neck ROM: full  Mouth opening: 3 or more finger widths     Cardiovascular - normal exam  Rhythm: regular  Rate: normal     Dental    Pulmonary - normal exam   Abdominal - normal exam           Anesthesia Plan    History of general anesthesia?: yes  History of complications of general anesthesia?: no    ASA 3     MAC   (Standard ASA monitoring.)  intravenous induction   Anesthetic plan and risks discussed with patient.    Plan discussed with CRNA and CAA.             [1]   Past Medical History:  Diagnosis Date    Breast cancer 2011    right breast    HTN (hypertension)     Hyperlipidemia     Hyperlipidemia     Kienbock's disease     Personal history of irradiation     right breast   [2]   Past Surgical History:  Procedure Laterality Date    BREAST LUMPECTOMY Right 01/14/2011    COLONOSCOPY      ORIF ANKLE FRACTURE Right 10/15/2016    SENTINEL LYMPH NODE BIOPSY Right 01/14/2011    WRIST ARTHROPLASTY Right 02/17/2023   [3]   Social History  Tobacco Use    Smoking status: Every Day     Current packs/day: 0.50     Average packs/day: 0.5 packs/day for 56.3 years (28.2 ttl pk-yrs)     Types: Cigarettes     Start date: 1969    Smokeless tobacco: Never   Substance Use Topics    Alcohol use: Yes     Alcohol/week: 14.0 - 21.0 standard drinks of " alcohol     Types: 14 - 21 Standard drinks or equivalent per week     Comment: 2-3 drinks a night    Drug use: Yes     Types: Marijuana     Comment: rare   [4]   Allergies  Allergen Reactions    Lisinopril Other     TINGLING IN UPPER EXTREMITY

## 2025-05-08 ENCOUNTER — ANESTHESIA (OUTPATIENT)
Dept: GASTROENTEROLOGY | Facility: HOSPITAL | Age: 70
End: 2025-05-08
Payer: MEDICARE

## 2025-05-08 ENCOUNTER — HOSPITAL ENCOUNTER (OUTPATIENT)
Dept: GASTROENTEROLOGY | Facility: HOSPITAL | Age: 70
Discharge: HOME | End: 2025-05-08
Payer: MEDICARE

## 2025-05-08 VITALS
SYSTOLIC BLOOD PRESSURE: 163 MMHG | DIASTOLIC BLOOD PRESSURE: 82 MMHG | HEIGHT: 65 IN | WEIGHT: 145.5 LBS | TEMPERATURE: 97.7 F | OXYGEN SATURATION: 98 % | RESPIRATION RATE: 15 BRPM | BODY MASS INDEX: 24.24 KG/M2 | HEART RATE: 70 BPM

## 2025-05-08 DIAGNOSIS — Z87.19 HISTORY OF RECTAL POLYPS: ICD-10-CM

## 2025-05-08 PROCEDURE — 3700000001 HC GENERAL ANESTHESIA TIME - INITIAL BASE CHARGE

## 2025-05-08 PROCEDURE — 7100000009 HC PHASE TWO TIME - INITIAL BASE CHARGE

## 2025-05-08 PROCEDURE — 7100000010 HC PHASE TWO TIME - EACH INCREMENTAL 1 MINUTE

## 2025-05-08 PROCEDURE — 2500000004 HC RX 250 GENERAL PHARMACY W/ HCPCS (ALT 636 FOR OP/ED): Mod: JW | Performed by: NURSE ANESTHETIST, CERTIFIED REGISTERED

## 2025-05-08 PROCEDURE — 45338 SIGMOIDOSCOPY W/TUMR REMOVE: CPT | Performed by: INTERNAL MEDICINE

## 2025-05-08 PROCEDURE — 2720000007 HC OR 272 NO HCPCS

## 2025-05-08 PROCEDURE — 45335 SIGMOIDOSCOPY W/SUBMUC INJ: CPT | Performed by: INTERNAL MEDICINE

## 2025-05-08 PROCEDURE — 3700000002 HC GENERAL ANESTHESIA TIME - EACH INCREMENTAL 1 MINUTE

## 2025-05-08 RX ORDER — ATORVASTATIN CALCIUM 10 MG/1
10 TABLET, FILM COATED ORAL DAILY
COMMUNITY

## 2025-05-08 RX ORDER — PROPOFOL 10 MG/ML
INJECTION, EMULSION INTRAVENOUS AS NEEDED
Status: DISCONTINUED | OUTPATIENT
Start: 2025-05-08 | End: 2025-05-08

## 2025-05-08 RX ORDER — LIDOCAINE HYDROCHLORIDE 20 MG/ML
INJECTION, SOLUTION EPIDURAL; INFILTRATION; INTRACAUDAL; PERINEURAL AS NEEDED
Status: DISCONTINUED | OUTPATIENT
Start: 2025-05-08 | End: 2025-05-08

## 2025-05-08 RX ADMIN — PROPOFOL 100 MCG/KG/MIN: 10 INJECTION, EMULSION INTRAVENOUS at 10:17

## 2025-05-08 RX ADMIN — LIDOCAINE HYDROCHLORIDE 40 MG: 20 INJECTION, SOLUTION EPIDURAL; INFILTRATION; INTRACAUDAL; PERINEURAL at 10:17

## 2025-05-08 RX ADMIN — PROPOFOL 130 MCG/KG/MIN: 10 INJECTION, EMULSION INTRAVENOUS at 10:19

## 2025-05-08 RX ADMIN — PROPOFOL 9.32 MG: 10 INJECTION, EMULSION INTRAVENOUS at 10:30

## 2025-05-08 RX ADMIN — PROPOFOL 30 MG: 10 INJECTION, EMULSION INTRAVENOUS at 10:21

## 2025-05-08 RX ADMIN — PROPOFOL 30 MG: 10 INJECTION, EMULSION INTRAVENOUS at 10:18

## 2025-05-08 ASSESSMENT — PAIN SCALES - GENERAL
PAINLEVEL_OUTOF10: 0 - NO PAIN
PAINLEVEL_OUTOF10: 5 - MODERATE PAIN
PAINLEVEL_OUTOF10: 0 - NO PAIN
PAINLEVEL_OUTOF10: 0 - NO PAIN
PAINLEVEL_OUTOF10: 5 - MODERATE PAIN

## 2025-05-08 ASSESSMENT — PAIN - FUNCTIONAL ASSESSMENT
PAIN_FUNCTIONAL_ASSESSMENT: 0-10

## 2025-05-08 ASSESSMENT — COLUMBIA-SUICIDE SEVERITY RATING SCALE - C-SSRS
1. IN THE PAST MONTH, HAVE YOU WISHED YOU WERE DEAD OR WISHED YOU COULD GO TO SLEEP AND NOT WAKE UP?: NO
6. HAVE YOU EVER DONE ANYTHING, STARTED TO DO ANYTHING, OR PREPARED TO DO ANYTHING TO END YOUR LIFE?: NO
2. HAVE YOU ACTUALLY HAD ANY THOUGHTS OF KILLING YOURSELF?: NO

## 2025-05-08 ASSESSMENT — ENCOUNTER SYMPTOMS: CONSTITUTIONAL NEGATIVE: 1

## 2025-05-08 NOTE — ANESTHESIA POSTPROCEDURE EVALUATION
Patient: Alicia Burris    Procedure Summary       Date: 05/08/25 Room / Location: [facility]    Anesthesia Start: 1014 Anesthesia Stop: 1038    Procedure: FLEXIBLE SIGMOIDOSCOPY Diagnosis: History of rectal polyps    Scheduled Providers: Dominick Morin DO; Stephen Stubbs MD; MADDIE Perry-CRNA Responsible Provider: Stephen Stubbs MD    Anesthesia Type: MAC ASA Status: 3            Anesthesia Type: MAC    Vitals Value Taken Time   /82 05/08/25 11:07   Temp 36.5 °C (97.7 °F) 05/08/25 10:37   Pulse 70 05/08/25 11:07   Resp 15 05/08/25 11:07   SpO2 98 % 05/08/25 11:07       Anesthesia Post Evaluation    Patient location during evaluation: PACU  Patient participation: complete - patient participated  Level of consciousness: awake and alert  Pain management: adequate  Airway patency: patent  Cardiovascular status: acceptable and hemodynamically stable  Respiratory status: acceptable, spontaneous ventilation and nonlabored ventilation  Hydration status: acceptable  Postoperative Nausea and Vomiting: none        There were no known notable events for this encounter.

## 2025-05-08 NOTE — DISCHARGE INSTRUCTIONS

## 2025-05-08 NOTE — H&P
"History Of Present Illness  Alicia Burris is a 70 y.o. female presenting with multiple colon polyps and one large LST in the rectum s/p ESD with this being the first 6 month follow up.     Past Medical History  Medical History[1]  Surgical History  Surgical History[2]  Social History  She reports that she has been smoking cigarettes. She started smoking about 56 years ago. She has a 28.2 pack-year smoking history. She has never used smokeless tobacco. She reports current alcohol use of about 14.0 - 21.0 standard drinks of alcohol per week. She reports current drug use. Drug: Marijuana.    Family History  Family History[3]     Allergies  Allergies[4]  Review of Systems   Constitutional: Negative.         Physical Exam  Constitutional:       Appearance: Normal appearance.   Cardiovascular:      Rate and Rhythm: Normal rate and regular rhythm.   Pulmonary:      Effort: Pulmonary effort is normal.      Breath sounds: Normal breath sounds.   Abdominal:      General: Abdomen is flat.      Palpations: Abdomen is soft.   Neurological:      Mental Status: She is alert.   Psychiatric:         Mood and Affect: Mood normal.         Behavior: Behavior normal.         Thought Content: Thought content normal.         Judgment: Judgment normal.          Last Recorded Vitals  Blood pressure 179/86, pulse 93, temperature 36 °C (96.8 °F), temperature source Temporal, resp. rate 18, height 1.651 m (5' 5\"), weight 66 kg (145 lb 8.1 oz), SpO2 100%, not currently breastfeeding.    Assessment/Plan   Flexible sigmoidoscopy as planned     Dominick Morin DO       [1]   Past Medical History:  Diagnosis Date    Breast cancer 2011    right breast    HTN (hypertension)     Hyperlipidemia     Hyperlipidemia     Kienbock's disease     Personal history of irradiation     right breast   [2]   Past Surgical History:  Procedure Laterality Date    BREAST LUMPECTOMY Right 01/14/2011    COLONOSCOPY      ORIF ANKLE FRACTURE Right 10/15/2016    " SENTINEL LYMPH NODE BIOPSY Right 01/14/2011    WRIST ARTHROPLASTY Right 02/17/2023   [3]   Family History  Problem Relation Name Age of Onset    Alzheimer's disease Mother Matthew Santos I     Other (HTN) Mother Matthew Santos I     Alcohol abuse Mother Matthew Santos I     Alzheimer's disease Father      Breast cancer Sister      Breast cancer Sister      Other (CANCER-EYE DISEASE) Other FH    [4]   Allergies  Allergen Reactions    Lisinopril Other     TINGLING IN UPPER EXTREMITY

## 2025-05-09 ASSESSMENT — PAIN SCALES - GENERAL: PAINLEVEL_OUTOF10: 0 - NO PAIN

## 2025-05-15 LAB
LABORATORY COMMENT REPORT: NORMAL
PATH REPORT.FINAL DX SPEC: NORMAL
PATH REPORT.GROSS SPEC: NORMAL
PATH REPORT.TOTAL CANCER: NORMAL

## 2025-07-13 NOTE — ASSESSMENT & PLAN NOTE
>40PY history of tobacco use  not interested in quitting   Using [employer], I have shared information with the patient about interventions to reduce the risk of dying from lung cancer, including quitting smoking and annual lung cancer screening. The patient is eligible for screening based on age (69), smoking history (current smoker, 55 pack-years), and the absence of signs or symptoms of lung cancer. We estimated that 47 out of 1,000 patients like this patient will die of lung cancer over 5 years. This risk is reduced to 37 out of 1,000 with CT screening. This is based on personalized lung cancer risk generated by the Katki et al. prediction model. We provided the patient with information about the potential harms of screening, including: false positives and false negatives, follow-up diagnostic testing, over-diagnosis, and radiation exposure. I also counseled on the importance of adherence to annual lung cancer LDCT screening, impact of comorbidities and ability or willingness to undergo diagnosis and treatment.    After discussion, the patient decided to pursue yearly LDCT.   
Advised preventive measures  
Due for repeat bone density test with e/o height loss.   
Elevated today on current regimen of hydrochlorothiazide, losartan and metoprolol.   Switch to olmesartan/amlodipine, stop metoprolol   
On atorvastatin 20 mg, monitor severe calcifications noted on CT chest.  May consider more aggressive management after repeat blood work.  
S/p partial mastectomy and RT 2011 s/p letrozole stopped in 2020. Most recent mammogram stable.   Followed by Jen Torres encouraged to schedule   
S/p surgical intervention does home exercises at home still with severe discomfort  Followup with hand surgery, will refer.   
Yes

## 2025-07-31 ENCOUNTER — APPOINTMENT (OUTPATIENT)
Dept: PRIMARY CARE | Facility: CLINIC | Age: 70
End: 2025-07-31
Payer: MEDICARE

## 2025-07-31 VITALS
BODY MASS INDEX: 24.13 KG/M2 | WEIGHT: 145 LBS | OXYGEN SATURATION: 97 % | HEART RATE: 98 BPM | SYSTOLIC BLOOD PRESSURE: 160 MMHG | DIASTOLIC BLOOD PRESSURE: 97 MMHG

## 2025-07-31 DIAGNOSIS — M85.80 OSTEOPENIA, UNSPECIFIED LOCATION: ICD-10-CM

## 2025-07-31 DIAGNOSIS — R52 PAIN: ICD-10-CM

## 2025-07-31 DIAGNOSIS — Z00.00 ENCOUNTER FOR PREVENTATIVE ADULT HEALTH CARE EXAMINATION: ICD-10-CM

## 2025-07-31 DIAGNOSIS — F17.200 TOBACCO USE DISORDER: ICD-10-CM

## 2025-07-31 DIAGNOSIS — I10 ESSENTIAL HYPERTENSION: ICD-10-CM

## 2025-07-31 DIAGNOSIS — D53.9 MACROCYTIC ANEMIA: ICD-10-CM

## 2025-07-31 DIAGNOSIS — F17.210 NICOTINE DEPENDENCE, CIGARETTES, UNCOMPLICATED: ICD-10-CM

## 2025-07-31 DIAGNOSIS — J45.40 MODERATE PERSISTENT ASTHMA, UNSPECIFIED WHETHER COMPLICATED (HHS-HCC): ICD-10-CM

## 2025-07-31 DIAGNOSIS — Z12.31 SCREENING MAMMOGRAM FOR BREAST CANCER: ICD-10-CM

## 2025-07-31 DIAGNOSIS — E78.5 DYSLIPIDEMIA: ICD-10-CM

## 2025-07-31 DIAGNOSIS — I10 HTN (HYPERTENSION), BENIGN: Primary | ICD-10-CM

## 2025-07-31 DIAGNOSIS — Z00.00 ROUTINE GENERAL MEDICAL EXAMINATION AT HEALTH CARE FACILITY: ICD-10-CM

## 2025-07-31 PROBLEM — C50.119: Status: RESOLVED | Noted: 2023-02-02 | Resolved: 2025-07-31

## 2025-07-31 PROCEDURE — 99214 OFFICE O/P EST MOD 30 MIN: CPT | Performed by: INTERNAL MEDICINE

## 2025-07-31 PROCEDURE — G0439 PPPS, SUBSEQ VISIT: HCPCS | Performed by: INTERNAL MEDICINE

## 2025-07-31 PROCEDURE — 3077F SYST BP >= 140 MM HG: CPT | Performed by: INTERNAL MEDICINE

## 2025-07-31 PROCEDURE — 1123F ACP DISCUSS/DSCN MKR DOCD: CPT | Performed by: INTERNAL MEDICINE

## 2025-07-31 PROCEDURE — 99397 PER PM REEVAL EST PAT 65+ YR: CPT | Performed by: INTERNAL MEDICINE

## 2025-07-31 PROCEDURE — 1125F AMNT PAIN NOTED PAIN PRSNT: CPT | Performed by: INTERNAL MEDICINE

## 2025-07-31 PROCEDURE — 1170F FXNL STATUS ASSESSED: CPT | Performed by: INTERNAL MEDICINE

## 2025-07-31 PROCEDURE — 1159F MED LIST DOCD IN RCRD: CPT | Performed by: INTERNAL MEDICINE

## 2025-07-31 PROCEDURE — 1160F RVW MEDS BY RX/DR IN RCRD: CPT | Performed by: INTERNAL MEDICINE

## 2025-07-31 PROCEDURE — 1158F ADVNC CARE PLAN TLK DOCD: CPT | Performed by: INTERNAL MEDICINE

## 2025-07-31 PROCEDURE — 3080F DIAST BP >= 90 MM HG: CPT | Performed by: INTERNAL MEDICINE

## 2025-07-31 RX ORDER — ATORVASTATIN CALCIUM 10 MG/1
10 TABLET, FILM COATED ORAL DAILY
Qty: 90 TABLET | Refills: 1 | Status: SHIPPED | OUTPATIENT
Start: 2025-07-31

## 2025-07-31 RX ORDER — BUDESONIDE AND FORMOTEROL FUMARATE DIHYDRATE 160; 4.5 UG/1; UG/1
2 AEROSOL RESPIRATORY (INHALATION)
Qty: 10.2 G | Refills: 5 | Status: SHIPPED | OUTPATIENT
Start: 2025-07-31 | End: 2026-07-31

## 2025-07-31 RX ORDER — MELOXICAM 15 MG/1
15 TABLET ORAL DAILY PRN
Qty: 100 TABLET | Refills: 0 | Status: SHIPPED | OUTPATIENT
Start: 2025-07-31

## 2025-07-31 RX ORDER — LOSARTAN POTASSIUM 50 MG/1
50 TABLET ORAL DAILY
Qty: 90 TABLET | Refills: 1 | Status: SHIPPED | OUTPATIENT
Start: 2025-07-31

## 2025-07-31 RX ORDER — HYDROCHLOROTHIAZIDE 25 MG/1
25 TABLET ORAL DAILY
Qty: 100 TABLET | Refills: 2 | Status: SHIPPED | OUTPATIENT
Start: 2025-07-31

## 2025-07-31 ASSESSMENT — PATIENT HEALTH QUESTIONNAIRE - PHQ9
SUM OF ALL RESPONSES TO PHQ9 QUESTIONS 1 AND 2: 0
2. FEELING DOWN, DEPRESSED OR HOPELESS: NOT AT ALL
1. LITTLE INTEREST OR PLEASURE IN DOING THINGS: NOT AT ALL
1. LITTLE INTEREST OR PLEASURE IN DOING THINGS: NOT AT ALL
SUM OF ALL RESPONSES TO PHQ9 QUESTIONS 1 & 2: 0
2. FEELING DOWN, DEPRESSED OR HOPELESS: NOT AT ALL

## 2025-07-31 ASSESSMENT — ACTIVITIES OF DAILY LIVING (ADL)
DOING_HOUSEWORK: INDEPENDENT
TAKING_MEDICATION: INDEPENDENT
BATHING: INDEPENDENT
GROCERY_SHOPPING: INDEPENDENT
MANAGING_FINANCES: INDEPENDENT
DRESSING: INDEPENDENT

## 2025-07-31 ASSESSMENT — PAIN SCALES - GENERAL: PAINLEVEL_OUTOF10: 5

## 2025-07-31 NOTE — ASSESSMENT & PLAN NOTE
Orders:    hydroCHLOROthiazide (HYDRODiuril) 25 mg tablet; Take 1 tablet (25 mg) by mouth once daily.    losartan (Cozaar) 50 mg tablet; Take 1 tablet (50 mg) by mouth once daily.    CBC and Auto Differential; Future    Comprehensive Metabolic Panel; Future    TSH with reflex to Free T4 if abnormal; Future

## 2025-07-31 NOTE — PATIENT INSTRUCTIONS
Alicia,   Labs including bloodwork and/or urine is ordered today. The lab can be found on this floor (2nd floor) next to the pharmacy across from the elevators.    Mammogram - Call [phone] to have this scheduled.    CT for lung cancer screening ordered   Vaccines to consider  Flu, COVID in the fall   RSV   Tetanus   Dentist followup   Dermatology for skin check   Blood pressure   Go to www.validatebp.org for list of validated blood pressure cuffs.   I recommend you monitor your home blood pressure readings. To do this, be sure that the blood pressure cuff is on bare skin. Feet should be planted on the floor and back should be supported. Arm should be resting at the level of the heart. No talking to anyone during measurement and no caffeine within 30 minutes of checking blood pressure.   Goal should be in the 120s/70s on AVERAGE (outliers do not count).   Ideally, you should measure 2 readings in the morning and 2 in the evening for 2 weeks and write these down.      Followup 3-6 months

## 2025-07-31 NOTE — PROGRESS NOTES
Subjective   Reason for Visit: Alicia Burris is an70 y.o. female who presents for a Medicare Wellness visit.    Past Medical, Surgical, and Family History reviewed and updated in chart.    Reviewed all medications by prescribing practitioner or clinical pharmacist (such as prescriptions, OTCs, herbal therapies and supplements) and documented in the medical record.    History of Present Illness  70-year-old female here for annual wellness visit and follow-up of chronic conditions, last seen in October    She experiences persistent wrist pain despite using a wrist brace and topical treatments like Voltaren gel, Biofreeze, and CBD creams. She is hesitant about surgical options due to potential loss of mobility and smoking cessation requirements. She has been out of meloxicam and is cautious about its side effects. She cannot take Advil and prefers to avoid Tylenol.    She has shortness of breath, managed with albuterol, especially in hot weather. She has a significant smoking history of 56.6 pack years and continues to smoke half a pack daily. Lung cancer screenings have been normal, and she is due for a CT scan.    She needs cataract surgery with a follow-up scheduled in October. Her blood pressure is high during visits but normal at home. Previous blood work indicated anemia.    She consumes two to three alcoholic drinks nightly and acknowledges this is excessive. Exercise is limited due to wrist issues, though she is interested in water jogging. She is independent in daily activities and drinks more water due to the heat.    Hx:  - Rectal mass - f/b Dr Morin laterally spreading tumor in the rectum status post endoscopic submucosal dissection has had subsequent sigmoidoscopy in May showing a 12 mm serrated polyp in the sigmoid colon and no evidence of polyp recurrence, polyp was hyperplastic recommended full colonoscopy in 3 years for surveillance  - DÍAZ -recommended stress testing no current symptoms   -  Tobacco use - <1PPD x many years,  not interested in quitting. Failed NRT and chantix.   - Asthma - DÍAZ with PFTs c/w likely asthma given ICS/LABA  - Kienbox disease - (osteonecrosis of the bone), has pain all the time, takes aleve for the pain, s/p partial wrist replacement still with residual discomfort of the right wrist, she still requires bracing with heating or icing referred back to hand clinic  - Bilatral SNHL - seen by ENT not a candidate for hearing aids.   - HTN  olmesartan/amlodipine (previously on HCTZ 25mg, losartan 100mg and metoprolol 25mg), measures home blood pressure readings.   - CAD subclinical-CAC over 400-on atorvastatin 40  - LDCT yearly -last performed 9/24 stable repeat 1 year, mild upper lung emphysema  - Osteopenia -most recent density notable for high FRAX score  - Breast cancer - stage II right invasive ductal carcinoma - s/p partial mastectomy and RT 2011, s/p letrozole and exemstane.  Followed by breast clinic follows with Jen Torres most recently seen in January as needed follow-up there is concern regarding recent skin thickening associated with radiation changes  - UTIs - treated with nitrofurantoin now resolved   - Numbness in her foot related to ankle surgery 6.5 years ago  - On aspirin for primary prevention  - Cataract surgery pending.     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   - 3 children - one son in [place] with his wife, son in [place] with wife and 3 kids, daughter in [place]       Lifestyle - lost 6 pounds since last being seen.   - Diet - does not eat much, eats later in the day, snacks on popcorn.breakfast scoops of yogurt and kashi cereal, never does lunch, varied dinner.   - Exercise - previously gardening, has a treadmill     PMHx, FHx, Social Hx, Surg Hx personally reviewed at this appointment. No  pertinent findings and/or changes from prior (if applicable).    ROS: Denies wt gain/loss f/c HA LoC CP SOB NVDC. See HPI above, and scanned sheet (if applicable). All other systems are reviewed and are without complaint.       Objective     BP (!) 160/97   Pulse 98   Wt 65.8 kg (145 lb)   LMP  (LMP Unknown)   SpO2 97%   BMI 24.13 kg/m²    General: Appears comfortable, NAD, appropriate affect  HEENT: NCAT, EOMI, pupils symmetric, no conjunctival erythema   Neck: Supple, no LAD   Heart: RRR S1 S2 no murmurs appreciated   Lungs: CTA bilaterally, no rhonchi, rales, or wheezes   Abdomen: Soft, NT/ND, no rebound or guarding, NABS   Extremities: no cyanosis or edema appreciated  Neuro: AAO x 3, answers questions appropriately, no FND    Lab Results   Component Value Date    WBC 7.6 09/17/2024    HGB 11.6 (L) 09/17/2024    HCT 34.3 (L) 09/17/2024     09/17/2024    CHOL 178 09/17/2024    TRIG 57 09/17/2024    HDL 77.4 09/17/2024    ALT 7 09/17/2024    AST 19 09/17/2024     09/17/2024    K 4.2 09/17/2024    CL 97 (L) 09/17/2024    CREATININE 0.71 09/17/2024    BUN 7 09/17/2024    CO2 31 09/17/2024    TSH 1.79 09/17/2024    HGBA1C 5.5 01/25/2023     par     Flexible Sigmoidoscopy  Table formatting from the original result was not included.  Impression  One 12 mm serrated polyp in the sigmoid colon; lift performed; performed   cold snare removal  Scar from prior ESD was noted in the rectum with no evidence of polyp   recurrence    Findings  One 12 mm serrated polyp in the sigmoid colon 15 cm from the anal verge;   lift performed with 0.5 mL of commercial lift agent injected into the   submucosa; performed cold snare with en bloc removal and retrieved   specimen  Scar from prior ESD was noted in the rectum with no evidence of polyp   recurrence       Recommendation  Await pathology results   Full colonoscopy in 3 years       Indication  History of rectal polyps    Post-Op Diagnosis  Personal history of  adenomatous and serrated colon polyps    Staff  Staff Role   Dominick Morin DO Proceduralist     Medications  See Anesthesia Record.     Preprocedure  A history and physical has been performed, and patient medication   allergies have been reviewed. The patient's tolerance of previous   anesthesia has been reviewed. The risks and benefits of the procedure and   the sedation options and risks were discussed with the patient. All   questions were answered and informed consent obtained.    Details of the Procedure  The patient underwent monitored anesthesia care, which was administered by   an anesthesia professional. The patient's blood pressure, heart rate,   level of consciousness, oxygen, respirations, ECG and ETCO2 were monitored   throughout the procedure. A digital rectal exam was performed. The scope   was introduced through the anus and advanced to the sigmoid colon.   Retroflexion was performed in the rectum. The quality of bowel preparation   was evaluated using the [place] Bowel Preparation Scale with scores of:   right colon = not assessed, transverse colon = not assessed, left colon =   3. Bowel prep was adequate. The patient's estimated blood loss was minimal   (<5 mL). The procedure was not difficult. The patient tolerated the   procedure well. There were no apparent adverse events. The total BBPS   score was 3.     Events  Procedure Events   Event Event Time   ENDO SCOPE IN TIME 5/8/2025 10:21 AM   ENDO SCOPE OUT TIME 5/8/2025 10:32 AM     Specimens  ID Type Source Tests Collected by Time   1 : polyp-cold snare Tissue COLON - SIGMOID POLYP SURGICAL PATHOLOGY EXAM   Juliane Peralta-HELADIO Mckeon 5/8/2025 1028     Procedure Location  [address] [address] [phone]    Referring Provider  Aj Mora MD, MS  [address] [facility], [place] [address]    Procedure Provider  Dominick Morin DO        Current  Outpatient Medications   Medication Instructions    albuterol (ProAir HFA) 90 mcg/actuation inhaler 2 puffs, inhalation, Every 6 hours PRN    atorvastatin (LIPITOR) 10 mg, oral, Daily    budesonide-formoterol (Symbicort) 160-4.5 mcg/actuation inhaler 2 puffs, inhalation, 2 times daily RT, Rinse mouth with water after use to reduce aftertaste and incidence of candidiasis. Do not swallow.    cholecalciferol, vitamin D3, 75 mcg (3,000 unit) tablet Daily RT    hydroCHLOROthiazide (HYDRODIURIL) 25 mg, oral, Daily    losartan (COZAAR) 50 mg, oral, Daily    meloxicam (MOBIC) 15 mg, oral, Daily PRN    sod sulf-pot chloride-mag sulf (Sutab) 1.479-0.188- 0.225 gram tablet 12 tablets, oral, See admin instructions          Assessment & Plan      Chronic right wrist pain with prior hardware and limited function  Chronic pain with dissatisfaction in current treatment. Not interested in wrist fusion. Meloxicam used sparingly due to side effects.  - Continue meloxicam as needed, sparingly.  - Consider alternative pain management strategies.    Hypertension  Blood pressure high in office, reportedly normal at home. Home monitoring recommended.  - Encourage home blood pressure monitoring.  - Consider bringing home blood pressure cuff to office for calibration.    Exertional dyspnea  Exertional dyspnea relieved by albuterol. CT scan due for annual screening. Symbicort recommended for asthma control.  - Order CT scan of the lungs for annual screening.  - Continue albuterol as needed.  - Prescribe Symbicort inhaler and instruct on proper use.    Nicotine dependence, current smoker  Current smoker with normal lung cancer screenings. Not interested in quitting.  - Continue annual lung cancer screenings.  - Discuss smoking cessation options at future visits.    Alcohol use, daily  Daily alcohol use of two to three drinks per night, considered high. Not interested in medication for cravings.  - Advise reducing alcohol intake to one drink  per night.    Medicare Annual Wellness Visit  Completed visit with no concerns for depression, falls, or memory issues. High alcohol intake noted. Office blood pressure high, reportedly normal at home.  - Encourage treadmill use for exercise.  - Advise reducing alcohol intake to one drink per night.  - Order blood work to recheck anemia and other parameters.  - Schedule virtual or in-person follow-up in 3-6 months to corroborate blood pressure readings.\    Adult Health Examination  Age appropriate screening performed   Depression screen   No additional pertinent family history or toxic habits   No high risk sexual behavior,   Cancer screening:   - Colonoscopy as above, repeat colonoscopy recommended 5/28  - Mammogram 9/24 ordered for repeat  - Pap smear N/A  - Skin cancer prevention strategies reviewed  Immunizations   - Due: Flu and COVID, Tdap, RSV  - UTD: Shingrix, pneumococcal  Dental, visual   Discussed adequate Vitamin D intake   DEXA as above 9/24    Followup 3-6 months   Assessment & Plan  Essential hypertension    Orders:    hydroCHLOROthiazide (HYDRODiuril) 25 mg tablet; Take 1 tablet (25 mg) by mouth once daily.    Pain    Orders:    meloxicam (Mobic) 15 mg tablet; Take 1 tablet (15 mg) by mouth once daily as needed for moderate pain (4 - 6).    HTN (hypertension), benign    Orders:    hydroCHLOROthiazide (HYDRODiuril) 25 mg tablet; Take 1 tablet (25 mg) by mouth once daily.    losartan (Cozaar) 50 mg tablet; Take 1 tablet (50 mg) by mouth once daily.    CBC and Auto Differential; Future    Comprehensive Metabolic Panel; Future    TSH with reflex to Free T4 if abnormal; Future    Tobacco use disorder    Orders:    CT lung screening low dose; Future    Dyslipidemia    Orders:    atorvastatin (Lipitor) 10 mg tablet; Take 1 tablet (10 mg) by mouth once daily.    Lipid Panel; Future    Screening mammogram for breast cancer    Orders:    BI mammo bilateral screening tomosynthesis; Future    Moderate  persistent asthma, unspecified whether complicated ([facility])    Orders:    budesonide-formoterol (Symbicort) 160-4.5 mcg/actuation inhaler; Inhale 2 puffs 2 times a day. Rinse mouth with water after use to reduce aftertaste and incidence of candidiasis. Do not swallow.    Osteopenia, unspecified location    Orders:    Vitamin D 25-Hydroxy,Total (for eval of Vitamin D levels); Future    Macrocytic anemia    Orders:    Vitamin B12; Future    Folate; Future    CBC and Auto Differential; Future    Iron and TIBC; Future    Ferritin; Future    Reticulocytes; Future    Nicotine dependence, cigarettes, uncomplicated    Orders:    CT lung screening low dose; Future    Routine general medical examination at health care facility    Orders:    1 Year Follow Up In [facility] - PCP - Wellness Exam; Future    Encounter for preventative adult health care examination    Orders:    CBC and Auto Differential; Future    Comprehensive Metabolic Panel; Future         Harriet Billingsley,           This medical note was created with the assistance of artificial intelligence (AI) for documentation purposes. The content has been reviewed and confirmed by the healthcare provider for accuracy and completeness. Patient consented to the use of audio recording and use of AI during their visit.

## 2025-08-20 ENCOUNTER — TELEPHONE (OUTPATIENT)
Dept: RADIOLOGY | Facility: HOSPITAL | Age: 70
End: 2025-08-20
Payer: MEDICARE